# Patient Record
Sex: MALE | Race: WHITE | NOT HISPANIC OR LATINO | Employment: FULL TIME | URBAN - METROPOLITAN AREA
[De-identification: names, ages, dates, MRNs, and addresses within clinical notes are randomized per-mention and may not be internally consistent; named-entity substitution may affect disease eponyms.]

---

## 2017-01-31 ENCOUNTER — OFFICE VISIT - HEALTHEAST (OUTPATIENT)
Dept: INTERNAL MEDICINE | Facility: CLINIC | Age: 57
End: 2017-01-31

## 2017-01-31 DIAGNOSIS — I25.10 CORONARY ATHEROSCLEROSIS: ICD-10-CM

## 2017-01-31 DIAGNOSIS — E78.5 HYPERLIPIDEMIA: ICD-10-CM

## 2017-01-31 DIAGNOSIS — Z00.00 ROUTINE GENERAL MEDICAL EXAMINATION AT A HEALTH CARE FACILITY: ICD-10-CM

## 2017-01-31 DIAGNOSIS — H91.90 HEARING LOSS: ICD-10-CM

## 2017-01-31 LAB
CHOLEST SERPL-MCNC: 151 MG/DL
FASTING STATUS PATIENT QL REPORTED: YES
HDLC SERPL-MCNC: 54 MG/DL
LDLC SERPL CALC-MCNC: 82 MG/DL
PSA SERPL-MCNC: 1 NG/ML (ref 0–3.5)
TRIGL SERPL-MCNC: 76 MG/DL

## 2017-01-31 ASSESSMENT — MIFFLIN-ST. JEOR: SCORE: 1577.16

## 2017-02-20 ENCOUNTER — OFFICE VISIT - HEALTHEAST (OUTPATIENT)
Dept: CARDIOLOGY | Facility: CLINIC | Age: 57
End: 2017-02-20

## 2017-02-20 DIAGNOSIS — I25.10 ATHEROSCLEROSIS OF NATIVE CORONARY ARTERY OF NATIVE HEART WITHOUT ANGINA PECTORIS: ICD-10-CM

## 2017-02-20 DIAGNOSIS — E78.2 MIXED HYPERLIPIDEMIA: ICD-10-CM

## 2017-02-20 LAB
ATRIAL RATE - MUSE: 63 BPM
DIASTOLIC BLOOD PRESSURE - MUSE: NORMAL MMHG
INTERPRETATION ECG - MUSE: NORMAL
P AXIS - MUSE: 77 DEGREES
PR INTERVAL - MUSE: 156 MS
QRS DURATION - MUSE: 102 MS
QT - MUSE: 412 MS
QTC - MUSE: 421 MS
R AXIS - MUSE: 82 DEGREES
SYSTOLIC BLOOD PRESSURE - MUSE: NORMAL MMHG
T AXIS - MUSE: 47 DEGREES
VENTRICULAR RATE- MUSE: 63 BPM

## 2017-02-20 ASSESSMENT — MIFFLIN-ST. JEOR: SCORE: 1599.84

## 2017-03-16 ENCOUNTER — COMMUNICATION - HEALTHEAST (OUTPATIENT)
Dept: INTERNAL MEDICINE | Facility: CLINIC | Age: 57
End: 2017-03-16

## 2017-03-17 ENCOUNTER — OFFICE VISIT - HEALTHEAST (OUTPATIENT)
Dept: AUDIOLOGY | Facility: CLINIC | Age: 57
End: 2017-03-17

## 2017-03-17 DIAGNOSIS — H90.3 SENSORINEURAL HEARING LOSS, BILATERAL: ICD-10-CM

## 2017-03-17 DIAGNOSIS — H93.13 TINNITUS, BILATERAL: ICD-10-CM

## 2017-05-19 ENCOUNTER — COMMUNICATION - HEALTHEAST (OUTPATIENT)
Dept: INTERNAL MEDICINE | Facility: CLINIC | Age: 57
End: 2017-05-19

## 2017-05-22 ENCOUNTER — COMMUNICATION - HEALTHEAST (OUTPATIENT)
Dept: CARDIOLOGY | Facility: CLINIC | Age: 57
End: 2017-05-22

## 2017-05-22 DIAGNOSIS — E78.2 MIXED HYPERLIPIDEMIA: ICD-10-CM

## 2017-05-23 ENCOUNTER — COMMUNICATION - HEALTHEAST (OUTPATIENT)
Dept: CARDIOLOGY | Facility: CLINIC | Age: 57
End: 2017-05-23

## 2017-05-23 DIAGNOSIS — E78.2 MIXED HYPERLIPIDEMIA: ICD-10-CM

## 2017-09-27 ENCOUNTER — COMMUNICATION - HEALTHEAST (OUTPATIENT)
Dept: CARDIOLOGY | Facility: CLINIC | Age: 57
End: 2017-09-27

## 2017-09-27 DIAGNOSIS — E78.5 HYPERLIPIDEMIA: ICD-10-CM

## 2018-01-15 ENCOUNTER — COMMUNICATION - HEALTHEAST (OUTPATIENT)
Dept: ADMINISTRATIVE | Facility: CLINIC | Age: 58
End: 2018-01-15

## 2018-01-21 ENCOUNTER — RECORDS - HEALTHEAST (OUTPATIENT)
Dept: ADMINISTRATIVE | Facility: OTHER | Age: 58
End: 2018-01-21

## 2018-08-30 ENCOUNTER — OFFICE VISIT - HEALTHEAST (OUTPATIENT)
Dept: INTERNAL MEDICINE | Facility: CLINIC | Age: 58
End: 2018-08-30

## 2018-08-30 DIAGNOSIS — E78.5 HYPERLIPIDEMIA: ICD-10-CM

## 2018-08-30 DIAGNOSIS — I25.10 CORONARY ATHEROSCLEROSIS: ICD-10-CM

## 2018-08-30 DIAGNOSIS — Z00.00 ROUTINE GENERAL MEDICAL EXAMINATION AT A HEALTH CARE FACILITY: ICD-10-CM

## 2018-08-30 ASSESSMENT — MIFFLIN-ST. JEOR: SCORE: 1590.77

## 2018-09-13 ENCOUNTER — OFFICE VISIT - HEALTHEAST (OUTPATIENT)
Dept: CARDIOLOGY | Facility: CLINIC | Age: 58
End: 2018-09-13

## 2018-09-13 DIAGNOSIS — E78.2 MIXED HYPERLIPIDEMIA: ICD-10-CM

## 2018-09-13 DIAGNOSIS — I25.10 ATHEROSCLEROSIS OF NATIVE CORONARY ARTERY OF NATIVE HEART WITHOUT ANGINA PECTORIS: ICD-10-CM

## 2018-09-15 ENCOUNTER — RECORDS - HEALTHEAST (OUTPATIENT)
Dept: ADMINISTRATIVE | Facility: OTHER | Age: 58
End: 2018-09-15

## 2018-09-25 ENCOUNTER — COMMUNICATION - HEALTHEAST (OUTPATIENT)
Dept: INTERNAL MEDICINE | Facility: CLINIC | Age: 58
End: 2018-09-25

## 2018-09-25 DIAGNOSIS — B07.0 PLANTAR WARTS: ICD-10-CM

## 2018-10-24 ENCOUNTER — RECORDS - HEALTHEAST (OUTPATIENT)
Dept: ADMINISTRATIVE | Facility: OTHER | Age: 58
End: 2018-10-24

## 2018-12-02 ENCOUNTER — COMMUNICATION - HEALTHEAST (OUTPATIENT)
Dept: INTERNAL MEDICINE | Facility: CLINIC | Age: 58
End: 2018-12-02

## 2018-12-02 DIAGNOSIS — I25.10 CORONARY ATHEROSCLEROSIS: ICD-10-CM

## 2019-03-29 ENCOUNTER — RECORDS - HEALTHEAST (OUTPATIENT)
Dept: ADMINISTRATIVE | Facility: OTHER | Age: 59
End: 2019-03-29

## 2019-09-15 ENCOUNTER — COMMUNICATION - HEALTHEAST (OUTPATIENT)
Dept: CARDIOLOGY | Facility: CLINIC | Age: 59
End: 2019-09-15

## 2019-09-15 DIAGNOSIS — E78.2 MIXED HYPERLIPIDEMIA: ICD-10-CM

## 2019-09-15 DIAGNOSIS — I25.10 ATHEROSCLEROSIS OF NATIVE CORONARY ARTERY OF NATIVE HEART WITHOUT ANGINA PECTORIS: ICD-10-CM

## 2019-10-27 ENCOUNTER — COMMUNICATION - HEALTHEAST (OUTPATIENT)
Dept: INTERNAL MEDICINE | Facility: CLINIC | Age: 59
End: 2019-10-27

## 2019-10-27 DIAGNOSIS — I25.10 CORONARY ATHEROSCLEROSIS: ICD-10-CM

## 2019-12-06 ENCOUNTER — RECORDS - HEALTHEAST (OUTPATIENT)
Dept: ADMINISTRATIVE | Facility: OTHER | Age: 59
End: 2019-12-06

## 2019-12-11 ENCOUNTER — RECORDS - HEALTHEAST (OUTPATIENT)
Dept: ADMINISTRATIVE | Facility: OTHER | Age: 59
End: 2019-12-11

## 2019-12-11 ENCOUNTER — HOSPITAL ENCOUNTER (OUTPATIENT)
Dept: MRI IMAGING | Facility: CLINIC | Age: 59
Discharge: HOME | End: 2019-12-11
Attending: PHYSICIAN ASSISTANT

## 2019-12-11 DIAGNOSIS — M25.561 KNEE PAIN, RIGHT: ICD-10-CM

## 2019-12-16 ENCOUNTER — COMMUNICATION - HEALTHEAST (OUTPATIENT)
Dept: INTERNAL MEDICINE | Facility: CLINIC | Age: 59
End: 2019-12-16

## 2019-12-24 ENCOUNTER — RECORDS - HEALTHEAST (OUTPATIENT)
Dept: ADMINISTRATIVE | Facility: OTHER | Age: 59
End: 2019-12-24

## 2020-03-08 LAB
CHOLEST SERPL-MCNC: 128 MG/DL (ref 100–199)
HDLC SERPL-MCNC: 57 MG/DL
LDLC SERPL CALC-MCNC: 58 MG/DL
NON HDL CHOL. (LDL+VLDL): 71 MG/DL
TRIGLYCERIDES (HISTORICAL CONVERSION): 67 MG/DL

## 2020-03-13 ENCOUNTER — TRANSFERRED RECORDS (OUTPATIENT)
Dept: HEALTH INFORMATION MANAGEMENT | Facility: CLINIC | Age: 60
End: 2020-03-13

## 2020-04-01 ENCOUNTER — COMMUNICATION - HEALTHEAST (OUTPATIENT)
Dept: INTERNAL MEDICINE | Facility: CLINIC | Age: 60
End: 2020-04-01

## 2020-04-01 DIAGNOSIS — I25.10 CORONARY ATHEROSCLEROSIS: ICD-10-CM

## 2020-06-23 ENCOUNTER — RECORDS - HEALTHEAST (OUTPATIENT)
Dept: ADMINISTRATIVE | Facility: OTHER | Age: 60
End: 2020-06-23

## 2020-07-24 LAB
CREAT SERPL-MCNC: 1 MG/DL (ref 0.72–1.25)
GFR ESTIMATE EXT - HISTORICAL: >60 ML/MIN/1.73M2
GFR ESTIMATE, IF BLACK EXT - HISTORICAL: >60 ML/MIN/1.73M2

## 2020-07-29 ENCOUNTER — RECORDS - HEALTHEAST (OUTPATIENT)
Dept: ADMINISTRATIVE | Facility: OTHER | Age: 60
End: 2020-07-29

## 2020-08-05 ENCOUNTER — RECORDS - HEALTHEAST (OUTPATIENT)
Dept: HEALTH INFORMATION MANAGEMENT | Facility: CLINIC | Age: 60
End: 2020-08-05

## 2020-09-12 ENCOUNTER — RECORDS - HEALTHEAST (OUTPATIENT)
Dept: ADMINISTRATIVE | Facility: OTHER | Age: 60
End: 2020-09-12

## 2020-09-13 ENCOUNTER — RECORDS - HEALTHEAST (OUTPATIENT)
Dept: ADMINISTRATIVE | Facility: OTHER | Age: 60
End: 2020-09-13

## 2020-09-18 ENCOUNTER — RECORDS - HEALTHEAST (OUTPATIENT)
Dept: ADMINISTRATIVE | Facility: OTHER | Age: 60
End: 2020-09-18

## 2020-10-11 ENCOUNTER — COMMUNICATION - HEALTHEAST (OUTPATIENT)
Dept: CARDIOLOGY | Facility: CLINIC | Age: 60
End: 2020-10-11

## 2020-10-11 DIAGNOSIS — I25.10 ATHEROSCLEROSIS OF NATIVE CORONARY ARTERY OF NATIVE HEART WITHOUT ANGINA PECTORIS: ICD-10-CM

## 2020-10-11 DIAGNOSIS — E78.2 MIXED HYPERLIPIDEMIA: ICD-10-CM

## 2020-10-22 ENCOUNTER — OFFICE VISIT - HEALTHEAST (OUTPATIENT)
Dept: INTERNAL MEDICINE | Facility: CLINIC | Age: 60
End: 2020-10-22

## 2020-10-22 DIAGNOSIS — Z00.00 ROUTINE GENERAL MEDICAL EXAMINATION AT A HEALTH CARE FACILITY: ICD-10-CM

## 2020-10-22 DIAGNOSIS — E78.2 MIXED HYPERLIPIDEMIA: ICD-10-CM

## 2020-10-22 DIAGNOSIS — I25.10 ATHEROSCLEROSIS OF NATIVE CORONARY ARTERY OF NATIVE HEART WITHOUT ANGINA PECTORIS: ICD-10-CM

## 2020-10-22 DIAGNOSIS — D86.85 CARDIAC SARCOIDOSIS: ICD-10-CM

## 2020-10-22 LAB
ALBUMIN SERPL-MCNC: 4.1 G/DL (ref 3.5–5)
ALP SERPL-CCNC: 52 U/L (ref 45–120)
ALT SERPL W P-5'-P-CCNC: 59 U/L (ref 0–45)
ANION GAP SERPL CALCULATED.3IONS-SCNC: 9 MMOL/L (ref 5–18)
AST SERPL W P-5'-P-CCNC: 36 U/L (ref 0–40)
BILIRUB SERPL-MCNC: 1 MG/DL (ref 0–1)
BUN SERPL-MCNC: 17 MG/DL (ref 8–22)
CALCIUM SERPL-MCNC: 9.5 MG/DL (ref 8.5–10.5)
CHLORIDE BLD-SCNC: 101 MMOL/L (ref 98–107)
CHOLEST SERPL-MCNC: 168 MG/DL
CO2 SERPL-SCNC: 29 MMOL/L (ref 22–31)
CREAT SERPL-MCNC: 1.07 MG/DL (ref 0.7–1.3)
FASTING STATUS PATIENT QL REPORTED: YES
GFR SERPL CREATININE-BSD FRML MDRD: >60 ML/MIN/1.73M2
GLUCOSE BLD-MCNC: 89 MG/DL (ref 70–125)
HDLC SERPL-MCNC: 57 MG/DL
LDLC SERPL CALC-MCNC: 91 MG/DL
POTASSIUM BLD-SCNC: 4 MMOL/L (ref 3.5–5)
PROT SERPL-MCNC: 6.4 G/DL (ref 6–8)
PSA SERPL-MCNC: 1.2 NG/ML (ref 0–4.5)
SODIUM SERPL-SCNC: 139 MMOL/L (ref 136–145)
TRIGL SERPL-MCNC: 99 MG/DL

## 2020-10-22 ASSESSMENT — MIFFLIN-ST. JEOR: SCORE: 1590.77

## 2020-10-23 LAB — HCV AB SERPL QL IA: NEGATIVE

## 2021-02-04 ENCOUNTER — RECORDS - HEALTHEAST (OUTPATIENT)
Dept: ADMINISTRATIVE | Facility: OTHER | Age: 61
End: 2021-02-04

## 2021-03-17 ENCOUNTER — TRANSFERRED RECORDS (OUTPATIENT)
Dept: HEALTH INFORMATION MANAGEMENT | Facility: CLINIC | Age: 61
End: 2021-03-17

## 2021-03-17 ENCOUNTER — RECORDS - HEALTHEAST (OUTPATIENT)
Dept: ADMINISTRATIVE | Facility: OTHER | Age: 61
End: 2021-03-17

## 2021-03-17 ENCOUNTER — TELEPHONE (OUTPATIENT)
Dept: OPHTHALMOLOGY | Facility: CLINIC | Age: 61
End: 2021-03-17

## 2021-03-17 ENCOUNTER — MEDICAL CORRESPONDENCE (OUTPATIENT)
Dept: HEALTH INFORMATION MANAGEMENT | Facility: CLINIC | Age: 61
End: 2021-03-17

## 2021-03-17 NOTE — TELEPHONE ENCOUNTER
Spoke to patient as we received referral from Dr. Oden to see Dr. Boyd within one week.  He feels his vision is slightly worse.  Assisted with scheduling with Dr. Oliver Dimas on 3/17/2021 at 4:42 PM

## 2021-03-18 NOTE — TELEPHONE ENCOUNTER
FUTURE VISIT INFORMATION      FUTURE VISIT INFORMATION:    Date: 3.24.21    Time: 7:15 AM    Location: [facility]  REFERRAL INFORMATION:    Referring provider:  Dr Rashel Oden    Referring providers clinic:  [facility]    Reason for visit/diagnosis: concern for optic neuritis    RECORDS REQUESTED FROM:       Clinic name Comments Records Status Imaging Status   [facility] 3.17.21 Dr Oden  6.23.21 Scanned                                    Action 3.18.21 7:43 AM BRISSA   Action Taken Sent request to [facility] [phone]

## 2021-03-24 ENCOUNTER — TELEPHONE (OUTPATIENT)
Dept: OPHTHALMOLOGY | Facility: CLINIC | Age: 61
End: 2021-03-24

## 2021-03-24 ENCOUNTER — PRE VISIT (OUTPATIENT)
Dept: OPHTHALMOLOGY | Facility: CLINIC | Age: 61
End: 2021-03-24

## 2021-03-24 ENCOUNTER — OFFICE VISIT (OUTPATIENT)
Dept: OPHTHALMOLOGY | Facility: CLINIC | Age: 61
End: 2021-03-24
Payer: COMMERCIAL

## 2021-03-24 DIAGNOSIS — D86.9 SARCOIDOSIS: ICD-10-CM

## 2021-03-24 DIAGNOSIS — H53.40 VISUAL FIELD DEFECT: ICD-10-CM

## 2021-03-24 DIAGNOSIS — H46.9 OPTIC NEUROPATHY: Primary | ICD-10-CM

## 2021-03-24 DIAGNOSIS — H53.40 VISUAL FIELD DEFECT: Primary | ICD-10-CM

## 2021-03-24 PROBLEM — D86.85 CARDIAC SARCOIDOSIS: Status: ACTIVE | Noted: 2020-07-31

## 2021-03-24 PROBLEM — E78.5 HYPERLIPIDEMIA: Status: ACTIVE | Noted: 2021-03-24

## 2021-03-24 PROBLEM — N40.0 BENIGN PROSTATIC HYPERPLASIA: Status: ACTIVE | Noted: 2021-03-24

## 2021-03-24 PROBLEM — I47.10 SVT (SUPRAVENTRICULAR TACHYCARDIA) (H): Status: ACTIVE | Noted: 2020-03-08

## 2021-03-24 PROCEDURE — 92133 CPTRZD OPH DX IMG PST SGM ON: CPT | Performed by: OPHTHALMOLOGY

## 2021-03-24 PROCEDURE — 92083 EXTENDED VISUAL FIELD XM: CPT | Performed by: OPHTHALMOLOGY

## 2021-03-24 PROCEDURE — 99245 OFF/OP CONSLTJ NEW/EST HI 55: CPT | Mod: GC | Performed by: OPHTHALMOLOGY

## 2021-03-24 RX ORDER — AMIODARONE HYDROCHLORIDE 200 MG/1
100 TABLET ORAL
COMMUNITY
Start: 2020-09-18 | End: 2022-01-06

## 2021-03-24 RX ORDER — ACETAMINOPHEN 325 MG/1
325 TABLET ORAL
COMMUNITY

## 2021-03-24 RX ORDER — METOPROLOL SUCCINATE 25 MG/1
25 TABLET, EXTENDED RELEASE ORAL
COMMUNITY
Start: 2020-10-12 | End: 2022-01-06

## 2021-03-24 RX ORDER — PREDNISONE 20 MG/1
20 TABLET ORAL 2 TIMES DAILY
COMMUNITY
Start: 2021-03-17 | End: 2022-01-06

## 2021-03-24 RX ORDER — PANTOPRAZOLE SODIUM 40 MG/1
TABLET, DELAYED RELEASE ORAL
COMMUNITY
Start: 2021-03-18 | End: 2022-01-06

## 2021-03-24 RX ORDER — LISINOPRIL 2.5 MG/1
2.5 TABLET ORAL DAILY
COMMUNITY
Start: 2021-03-09

## 2021-03-24 RX ORDER — FLUTICASONE PROPIONATE 50 MCG
2 SPRAY, SUSPENSION (ML) NASAL
COMMUNITY

## 2021-03-24 RX ORDER — ATORVASTATIN CALCIUM 20 MG/1
20 TABLET, FILM COATED ORAL
COMMUNITY
Start: 2020-10-12 | End: 2022-01-06

## 2021-03-24 RX ORDER — NITROGLYCERIN 0.4 MG/1
0.4 TABLET SUBLINGUAL
COMMUNITY
Start: 2020-04-02 | End: 2023-02-03

## 2021-03-24 ASSESSMENT — VISUAL ACUITY
OS_SC: 20/20
METHOD: SNELLEN - LINEAR
OD_SC: 20/50

## 2021-03-24 ASSESSMENT — SLIT LAMP EXAM - LIDS
COMMENTS: NORMAL
COMMENTS: NORMAL

## 2021-03-24 ASSESSMENT — CONF VISUAL FIELD
METHOD: COUNTING FINGERS
OD_NORMAL: 1
OS_NORMAL: 1

## 2021-03-24 ASSESSMENT — TONOMETRY
OD_IOP_MMHG: 17
IOP_METHOD: ICARE
OS_IOP_MMHG: 10

## 2021-03-24 NOTE — PROGRESS NOTES
Assessment & Plan     Fidel Woo is a 60 year old male with the following diagnoses:   1. Optic neuropathy    2. Visual field defect    3. Sarcoidosis         Patient was sent for consultation by Dr. Rashel Oden for optic disc edema both eyes.      HPI: 60 year old man with active sarcoidosis (of the heart, biopsy proven) was referred here for bilateral optic disc edema. In March 2021, he started noticing blots of blurred vision/blind spots while playing tennis. More recently he has been seeing flashes of light of different colors with patterns including J or W that move around. It happens every time he closes his eyes that started in March as well. on March 17th, he closed his left eye and he noticed that the his centeral vision is blurry while looking at the monitor. He thinks that his vision has been progressively getting worse. He has been having headaches at the top of his headache that started this weekend. Headaches were not associated with photophobia or phonophobia. He also had eye irritation in the right eye that has been intermittent since march 11. He denies any pain with eye movements or any diplopia. When he is reading on monitor, the blurry part look pixilated with LED lights. Never used sildenafil and he does not have sleep apnea. He feels that his memory has been impaired and he is shaking more frequent, he also has impaired motor function (noticed while playing guitar). He started noticing that for the past 1 year after he was diagnosed with sarcoidosis . He also has fatigue. His doctor thinks that his sarcoid is inactive.     He was tapered of prednisone (last dose 1 month ago, Feb 26, 2021). He started noticing subtle visual around that time. After he was seen at [facility] specialist, he was started on 40mg (for 1 week). He has not noticed any changes after prednisone, instead he was getting worse    Since winter he has been seeing halos from head lights while  driving at night.     He is taking amiodarone 100mg.  He started this in September 2020.      Cardiac Sarcoidosis diagnosed in 3/2020 with biopsy , being managed by Dr Adriano Paiz  [phone]  (cardiologist)    No MRI done     Independent historians:  Patient and wife     Review of outside testing:  None    My interpretation performed today of outside testing:  Not applicable      Review of outside clinical notes:  Records from Dr. Oden    Past medical history: sarcoid arrhythmia, heart attack in 2010    Meds: prednisone 40mg, lisinopril, amiodarone since sept 2020 (decreased to 100mg 3 weeks ago), lipitor, metaprolol, mycophenolate 2 tablets 500mg twice a day since June 2020      Family history / social history:   Noncontributory    Exam:  Her visual acuity is 20/50 in the right eye and 20/20 in the left eye. IOP is 17/10. Her was unable to read color plates in the right eye, 11/11 in the left eye    Tests ordered and interpreted today:  Automated visual fields showed right central defect extending inferiorly in the right eye and superior arcuate defect in the left eye  OCT optic nerves: RNFL thickening in both eyes, worse in the right eye     Discussion of management / interpretation with another provider: None      My impression is that he has bilateral optic disc swelling worse in the right eye.  He has evidence of a bilateral optic neuropathy.  This is worse in the right eye than the left eye.  This is been slowly progressive for the last few weeks.  He denies pain.  He denies symptoms of giant cell arteritis.  He is currently on amiodarone and there is concern that he has a bilateral optic neuropathy as result of the amiodarone.  He also has a history of sarcoidosis.  It is his understanding that his sarcoid is currently inactive.  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  I believe the only way to really tell this is whether he has inflammation seen on MRI.  I will obtain  an MRI of the orbit.  He has a defibrillator which he states is MRI compatible.  I called his cardiologist and confirmed that we could stop the amiodarone.     The patient is presenting with an acute illness that potentially poses a threat to life or bodily function (vision).                Attending Physician Attestation:  Complete documentation of historical and exam elements from today's encounter can be found in the full encounter summary report (not reduplicated in this progress note).  I personally obtained the chief complaint(s) and history of present illness.  I confirmed and edited as necessary the review of systems, past medical/surgical history, family history, social history, and examination findings as documented by others; and I examined the patient myself.  I personally reviewed the relevant tests, images, and reports as documented above.  I formulated and edited as necessary the assessment and plan and discussed the findings and management plan with the patient and family. I personally reviewed the ophthalmic test(s) associated with this encounter, agree with the interpretation(s) as documented by the resident/fellow, and have edited the corresponding report(s) as necessary.  - Michael Cote MD  Neuro-ophthalmology fellow  [facility]

## 2021-03-24 NOTE — TELEPHONE ENCOUNTER
Spoke to patient.  MRI scheduled for 3/25 at the [facility].  Dr. Boyd also spoke to patient's cardiologist Dr. Jose and ok for patient to stop amiodarone.  Patient is still taking prednisone 40 mg daily.  Will let Dr. Boyd know.     Stephanie Dimas on 3/24/2021 at 1:41 PM

## 2021-03-24 NOTE — Clinical Note
3/24/2021       RE: Fidel Woo  [address] [address]     Dear Colleague,    Thank you for referring your patient, Fidel Woo, to the [facility] [place] at [facility]. Please see a copy of my visit note below.         Assessment & Plan     Fidel Woo is a 60 year old male with the following diagnoses:   1. Optic neuropathy    2. Visual field defect    3. Sarcoidosis         Patient was sent for consultation by Dr. Rashel Oden for optic disc edema both eyes.      HPI: 60 year old man with active sarcoidosis (of the heart, biopsy proven) was referred here for bilateral optic disc edema. In March 2021, he started noticing blots of blurred vision/blind spots while playing tennis. More recently he has been seeing flashes of light of different colors with patterns including J or W that move around. It happens every time he closes his eyes that started in March as well. on March 17th, he closed his left eye and he noticed that the his centeral vision is blurry while looking at the monitor. He thinks that his vision has been progressively getting worse. He has been having headaches at the top of his headache that started this weekend. Headaches were not associated with photophobia or phonophobia. He also had eye irritation in the right eye that has been intermittent since march 11. He denies any pain with eye movements or any diplopia. When he is reading on monitor, the blurry part look pixilated with LED lights. Never used sildenafil and he does not have sleep apnea. He feels that his memory has been impaired and he is shaking more frequent, he also has impaired motor function (noticed while playing guitar). He started noticing that for the past 1 year after he was diagnosed with sarcoidosis . He also has fatigue. His doctor thinks that his sarcoid is inactive.     He was tapered of prednisone  (last dose 1 month ago, Feb 26, 2021). He started noticing subtle visual around that time. After he was seen at [facility] eye specialist, he was started on 40mg (for 1 week). He has not noticed any changes after prednisone, instead he was getting worse    Since winter he has been seeing halos from head lights while driving at night.     He is taking amiodarone 100mg.  He started this in September 2020.      Cardiac Sarcoidosis diagnosed in 3/2020 with biopsy , being managed by Dr Adriano Paiz  [phone]  (cardiologist)    No MRI done     Independent historians:  Patient and wife     Review of outside testing:  None    My interpretation performed today of outside testing:  Not applicable      Review of outside clinical notes:  Records from Dr. Oden    Past medical history: sarcoid arrhythmia, heart attack in 2010    Meds: prednisone 40mg, lisinopril, amiodarone since sept 2020 (decreased to 100mg 3 weeks ago), lipitor, metaprolol, mycophenolate 2 tablets 500mg twice a day since June 2020      Family history / social history:   Noncontributory    Exam:  Her visual acuity is 20/50 in the right eye and 20/20 in the left eye. IOP is 17/10. Her was unable to read color plates in the right eye, 11/11 in the left eye    Tests ordered and interpreted today:  Automated visual fields showed right central defect extending inferiorly in the right eye and superior arcuate defect in the left eye  OCT optic nerves: RNFL thickening in both eyes, worse in the right eye     Discussion of management / interpretation with another provider: None      My impression is that he has bilateral optic disc swelling worse in the right eye.  He has evidence of a bilateral optic neuropathy.  This is worse in the right eye than the left eye.  This is been slowly progressive for the last few weeks.  He denies pain.  He denies symptoms of giant cell arteritis.  He is currently on amiodarone and there is concern that he has a bilateral  optic neuropathy as result of the amiodarone.  He also has a history of sarcoidosis.  It is his understanding that his sarcoid is currently inactive.  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  I believe the only way to really tell this is whether he has inflammation seen on MRI.  I will obtain an MRI of the orbit.  He has a defibrillator which he states is MRI compatible.  I called his cardiologist and confirmed that we could stop the amiodarone.             Attending Physician Attestation:  Complete documentation of historical and exam elements from today's encounter can be found in the full encounter summary report (not reduplicated in this progress note).  I personally obtained the chief complaint(s) and history of present illness.  I confirmed and edited as necessary the review of systems, past medical/surgical history, family history, social history, and examination findings as documented by others; and I examined the patient myself.  I personally reviewed the relevant tests, images, and reports as documented above.  I formulated and edited as necessary the assessment and plan and discussed the findings and management plan with the patient and family. I personally reviewed the ophthalmic test(s) associated with this encounter, agree with the interpretation(s) as documented by the resident/fellow, and have edited the corresponding report(s) as necessary.  - Michael Cote MD  Neuro-ophthalmology fellow  [facility]            Again, thank you for allowing me to participate in the care of your patient.      Sincerely,    Michael Boyd MD

## 2021-03-24 NOTE — LETTER
3/24/2021         RE:  :  MRN: Fidel Woo  1960  [id number]     Dear Dr. Oden,    Thank you for asking me to see your very pleasant patient, Fidel Woo, in neuro-ophthalmic consultation.  I would like to thank you for sending your records and I have summarized them in the history of present illness. He presented with his spouse who provided additional history.  My assessment and plan are below.  For further details, please see my attached clinic note.      Assessment & Plan     Fidel Woo is a 60 year old male with the following diagnoses:   1. Optic neuropathy    2. Visual field defect    3. Sarcoidosis         Patient was sent for consultation by Dr. Rashel Oden for optic disc edema both eyes.      HPI: 60 year old man with active sarcoidosis (of the heart, biopsy proven) was referred here for bilateral optic disc edema. In 2021, he started noticing blots of blurred vision/blind spots while playing tennis. More recently he has been seeing flashes of light of different colors with patterns including J or W that move around. It happens every time he closes his eyes that started in March as well. on , he closed his left eye and he noticed that the his centeral vision is blurry while looking at the monitor. He thinks that his vision has been progressively getting worse. He has been having headaches at the top of his headache that started this weekend. Headaches were not associated with photophobia or phonophobia. He also had eye irritation in the right eye that has been intermittent since . He denies any pain with eye movements or any diplopia. When he is reading on monitor, the blurry part look pixilated with LED lights. Never used sildenafil and he does not have sleep apnea. He feels that his memory has been impaired and he is shaking more frequent, he also has impaired motor function (noticed while playing guitar). He started noticing that for the  past 1 year after he was diagnosed with sarcoidosis . He also has fatigue. His doctor thinks that his sarcoid is inactive.     He was tapered of prednisone (last dose 1 month ago, Feb 26, 2021). He started noticing subtle visual around that time. After he was seen at [facility] specialist, he was started on 40mg (for 1 week). He has not noticed any changes after prednisone, instead he was getting worse    Since winter he has been seeing halos from head lights while driving at night.     He is taking amiodarone 100mg.  He started this in September 2020.      Cardiac Sarcoidosis diagnosed in 3/2020 with biopsy , being managed by Dr Adriano Paiz  [phone]  (cardiologist)    No MRI done     Independent historians:  Patient and wife     Review of outside testing:  None    My interpretation performed today of outside testing:  Not applicable      Review of outside clinical notes:  Records from Dr. Oden    Past medical history: sarcoid arrhythmia, heart attack in 2010    Meds: prednisone 40mg, lisinopril, amiodarone since sept 2020 (decreased to 100mg 3 weeks ago), lipitor, metaprolol, mycophenolate 2 tablets 500mg twice a day since June 2020      Family history / social history:   Noncontributory    Exam:  Her visual acuity is 20/50 in the right eye and 20/20 in the left eye. IOP is 17/10. Her was unable to read color plates in the right eye, 11/11 in the left eye    Tests ordered and interpreted today:  Automated visual fields showed right central defect extending inferiorly in the right eye and superior arcuate defect in the left eye  OCT optic nerves: RNFL thickening in both eyes, worse in the right eye     Discussion of management / interpretation with another provider: None      My impression is that he has bilateral optic disc swelling worse in the right eye.  He has evidence of a bilateral optic neuropathy.  This is worse in the right eye than the left eye.  This is been slowly progressive for  the last few weeks.  He denies pain.  He denies symptoms of giant cell arteritis.  He is currently on amiodarone and there is concern that he has a bilateral optic neuropathy as result of the amiodarone.  He also has a history of sarcoidosis.  It is his understanding that his sarcoid is currently inactive.  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  I believe the only way to really tell this is whether he has inflammation seen on MRI.  I will obtain an MRI of the orbit.  He has a defibrillator which he states is MRI compatible.  I called his cardiologist and confirmed that we could stop the amiodarone.     The patient is presenting with an acute illness that potentially poses a threat to life or bodily function (vision).    Again, thank you for allowing me to participate in the care of your patient.      Sincerely,    Michael Boyd MD  Professor  Ophthalmology Residency   Director of Neuro-Ophthalmology  [employer] Endowed Chair  Departments of Ophthalmology, Neurology, and Neurosurgery  [address]  [address]  T - [phone]   - [phone]  [email or web address]      CC: Rashel Oden DO  [facility]  [address]  [place] [address]  Via Fax: [phone]    DX = optic neuropathy, sarcoid, amiodarone

## 2021-03-24 NOTE — NURSING NOTE
"Chief Complaints and History of Present Illnesses   Patient presents with     Optic Neuritis Evaluation     Chief Complaint(s) and History of Present Illness(es)     Optic Neuritis Evaluation     Laterality: right eye    Onset: 2 weeks ago    Quality: missing vision and spots in vision    Associated symptoms: headache (not as much when first noticed issues and feels its getting worse) and eye pain (RE, dry or like \"when you know your getting pink eye\")    Pain scale: 1/10              Comments     Fidel Woo is being seen for a consult today by the request of Dr. Oden for Optic Neuritis.  Pt notes in the bottom part of vision in the RE is gray and on the edge of it he notes it is 'splotchy', first noticed this issue around 2 weeks ago, March 11th.     Angeline NIELSEN March 24, 2021 7:15 AM                    "

## 2021-03-25 ENCOUNTER — HOSPITAL ENCOUNTER (OUTPATIENT)
Dept: MRI IMAGING | Facility: CLINIC | Age: 61
End: 2021-03-25
Attending: OPHTHALMOLOGY
Payer: COMMERCIAL

## 2021-03-25 ENCOUNTER — ANCILLARY PROCEDURE (OUTPATIENT)
Dept: CARDIOLOGY | Facility: CLINIC | Age: 61
End: 2021-03-25
Attending: OPHTHALMOLOGY
Payer: COMMERCIAL

## 2021-03-25 ENCOUNTER — HOSPITAL ENCOUNTER (OUTPATIENT)
Dept: GENERAL RADIOLOGY | Facility: CLINIC | Age: 61
End: 2021-03-25
Attending: OPHTHALMOLOGY
Payer: COMMERCIAL

## 2021-03-25 DIAGNOSIS — H53.40 VISUAL FIELD DEFECT: ICD-10-CM

## 2021-03-25 DIAGNOSIS — I42.9 CARDIOMYOPATHY (H): ICD-10-CM

## 2021-03-25 DIAGNOSIS — H46.9 OPTIC NEUROPATHY: ICD-10-CM

## 2021-03-25 DIAGNOSIS — D86.9 SARCOIDOSIS: ICD-10-CM

## 2021-03-25 DIAGNOSIS — I42.9 CARDIOMYOPATHY (H): Primary | ICD-10-CM

## 2021-03-25 PROCEDURE — A9585 GADOBUTROL INJECTION: HCPCS | Performed by: OPHTHALMOLOGY

## 2021-03-25 PROCEDURE — 70543 MRI ORBT/FAC/NCK W/O &W/DYE: CPT

## 2021-03-25 PROCEDURE — 71046 X-RAY EXAM CHEST 2 VIEWS: CPT | Mod: 26 | Performed by: RADIOLOGY

## 2021-03-25 PROCEDURE — 93287 PERI-PX DEVICE EVAL & PRGR: CPT

## 2021-03-25 PROCEDURE — 71046 X-RAY EXAM CHEST 2 VIEWS: CPT

## 2021-03-25 PROCEDURE — 93287 PERI-PX DEVICE EVAL & PRGR: CPT | Mod: 26 | Performed by: INTERNAL MEDICINE

## 2021-03-25 PROCEDURE — 70543 MRI ORBT/FAC/NCK W/O &W/DYE: CPT | Mod: 26 | Performed by: RADIOLOGY

## 2021-03-25 PROCEDURE — 255N000002 HC RX 255 OP 636: Performed by: OPHTHALMOLOGY

## 2021-03-25 RX ORDER — GADOBUTROL 604.72 MG/ML
7.5 INJECTION INTRAVENOUS ONCE
Status: COMPLETED | OUTPATIENT
Start: 2021-03-25 | End: 2021-03-25

## 2021-03-25 RX ADMIN — GADOBUTROL 7.5 ML: 604.72 INJECTION INTRAVENOUS at 16:30

## 2021-03-26 ENCOUNTER — TELEPHONE (OUTPATIENT)
Dept: OPHTHALMOLOGY | Facility: CLINIC | Age: 61
End: 2021-03-26

## 2021-03-26 LAB
MDC_IDC_LEAD_IMPLANT_DT: NORMAL
MDC_IDC_LEAD_LOCATION: NORMAL
MDC_IDC_LEAD_LOCATION_DETAIL_1: NORMAL
MDC_IDC_LEAD_MFG: NORMAL
MDC_IDC_LEAD_MODEL: NORMAL
MDC_IDC_LEAD_POLARITY_TYPE: NORMAL
MDC_IDC_LEAD_SERIAL: NORMAL
MDC_IDC_LEAD_SPECIAL_FUNCTION: NORMAL
MDC_IDC_MSMT_BATTERY_DTM: NORMAL
MDC_IDC_MSMT_BATTERY_DTM: NORMAL
MDC_IDC_MSMT_BATTERY_REMAINING_LONGEVITY: 105 MO
MDC_IDC_MSMT_BATTERY_REMAINING_LONGEVITY: 105 MO
MDC_IDC_MSMT_BATTERY_RRT_TRIGGER: 2.73
MDC_IDC_MSMT_BATTERY_RRT_TRIGGER: 2.73
MDC_IDC_MSMT_BATTERY_STATUS: NORMAL
MDC_IDC_MSMT_BATTERY_STATUS: NORMAL
MDC_IDC_MSMT_BATTERY_VOLTAGE: 2.98 V
MDC_IDC_MSMT_BATTERY_VOLTAGE: 2.98 V
MDC_IDC_MSMT_CAP_CHARGE_DTM: NORMAL
MDC_IDC_MSMT_CAP_CHARGE_DTM: NORMAL
MDC_IDC_MSMT_CAP_CHARGE_ENERGY: 18 J
MDC_IDC_MSMT_CAP_CHARGE_ENERGY: 18 J
MDC_IDC_MSMT_CAP_CHARGE_TIME: 3.87
MDC_IDC_MSMT_CAP_CHARGE_TIME: 3.87
MDC_IDC_MSMT_CAP_CHARGE_TYPE: NORMAL
MDC_IDC_MSMT_CAP_CHARGE_TYPE: NORMAL
MDC_IDC_MSMT_LEADCHNL_RA_IMPEDANCE_VALUE: 494 OHM
MDC_IDC_MSMT_LEADCHNL_RA_IMPEDANCE_VALUE: 494 OHM
MDC_IDC_MSMT_LEADCHNL_RA_PACING_THRESHOLD_AMPLITUDE: 0.62 V
MDC_IDC_MSMT_LEADCHNL_RA_PACING_THRESHOLD_AMPLITUDE: 0.62 V
MDC_IDC_MSMT_LEADCHNL_RA_PACING_THRESHOLD_AMPLITUDE: 0.75 V
MDC_IDC_MSMT_LEADCHNL_RA_PACING_THRESHOLD_AMPLITUDE: 0.75 V
MDC_IDC_MSMT_LEADCHNL_RA_PACING_THRESHOLD_PULSEWIDTH: 0.4 MS
MDC_IDC_MSMT_LEADCHNL_RA_SENSING_INTR_AMPL: 2.12 MV
MDC_IDC_MSMT_LEADCHNL_RA_SENSING_INTR_AMPL: 2.12 MV
MDC_IDC_MSMT_LEADCHNL_RA_SENSING_INTR_AMPL: 5 MV
MDC_IDC_MSMT_LEADCHNL_RA_SENSING_INTR_AMPL: 5 MV
MDC_IDC_MSMT_LEADCHNL_RV_IMPEDANCE_VALUE: 323 OHM
MDC_IDC_MSMT_LEADCHNL_RV_IMPEDANCE_VALUE: 323 OHM
MDC_IDC_MSMT_LEADCHNL_RV_IMPEDANCE_VALUE: 437 OHM
MDC_IDC_MSMT_LEADCHNL_RV_IMPEDANCE_VALUE: 437 OHM
MDC_IDC_MSMT_LEADCHNL_RV_PACING_THRESHOLD_AMPLITUDE: 1 V
MDC_IDC_MSMT_LEADCHNL_RV_PACING_THRESHOLD_PULSEWIDTH: 0.4 MS
MDC_IDC_MSMT_LEADCHNL_RV_SENSING_INTR_AMPL: 11 MV
MDC_IDC_MSMT_LEADCHNL_RV_SENSING_INTR_AMPL: 11 MV
MDC_IDC_MSMT_LEADCHNL_RV_SENSING_INTR_AMPL: 9.62 MV
MDC_IDC_MSMT_LEADCHNL_RV_SENSING_INTR_AMPL: 9.62 MV
MDC_IDC_PG_IMPLANT_DTM: NORMAL
MDC_IDC_PG_IMPLANT_DTM: NORMAL
MDC_IDC_PG_MFG: NORMAL
MDC_IDC_PG_MFG: NORMAL
MDC_IDC_PG_MODEL: NORMAL
MDC_IDC_PG_MODEL: NORMAL
MDC_IDC_PG_SERIAL: NORMAL
MDC_IDC_PG_SERIAL: NORMAL
MDC_IDC_PG_TYPE: NORMAL
MDC_IDC_PG_TYPE: NORMAL
MDC_IDC_SESS_CLINIC_NAME: NORMAL
MDC_IDC_SESS_CLINIC_NAME: NORMAL
MDC_IDC_SESS_DTM: NORMAL
MDC_IDC_SESS_DTM: NORMAL
MDC_IDC_SESS_TYPE: NORMAL
MDC_IDC_SESS_TYPE: NORMAL
MDC_IDC_SET_BRADY_AT_MODE_SWITCH_RATE: 171 {BEATS}/MIN
MDC_IDC_SET_BRADY_AT_MODE_SWITCH_RATE: 171 {BEATS}/MIN
MDC_IDC_SET_BRADY_HYSTRATE: NORMAL
MDC_IDC_SET_BRADY_HYSTRATE: NORMAL
MDC_IDC_SET_BRADY_LOWRATE: 60 {BEATS}/MIN
MDC_IDC_SET_BRADY_LOWRATE: 60 {BEATS}/MIN
MDC_IDC_SET_BRADY_MAX_SENSOR_RATE: 130 {BEATS}/MIN
MDC_IDC_SET_BRADY_MAX_SENSOR_RATE: 130 {BEATS}/MIN
MDC_IDC_SET_BRADY_MAX_TRACKING_RATE: 130 {BEATS}/MIN
MDC_IDC_SET_BRADY_MAX_TRACKING_RATE: 130 {BEATS}/MIN
MDC_IDC_SET_BRADY_MODE: NORMAL
MDC_IDC_SET_BRADY_MODE: NORMAL
MDC_IDC_SET_BRADY_PAV_DELAY_LOW: 180 MS
MDC_IDC_SET_BRADY_PAV_DELAY_LOW: 180 MS
MDC_IDC_SET_BRADY_SAV_DELAY_LOW: 150 MS
MDC_IDC_SET_BRADY_SAV_DELAY_LOW: 150 MS
MDC_IDC_SET_LEADCHNL_RA_PACING_AMPLITUDE: 1.5 V
MDC_IDC_SET_LEADCHNL_RA_PACING_AMPLITUDE: 1.5 V
MDC_IDC_SET_LEADCHNL_RA_PACING_ANODE_ELECTRODE_1: NORMAL
MDC_IDC_SET_LEADCHNL_RA_PACING_ANODE_ELECTRODE_1: NORMAL
MDC_IDC_SET_LEADCHNL_RA_PACING_ANODE_LOCATION_1: NORMAL
MDC_IDC_SET_LEADCHNL_RA_PACING_ANODE_LOCATION_1: NORMAL
MDC_IDC_SET_LEADCHNL_RA_PACING_CAPTURE_MODE: NORMAL
MDC_IDC_SET_LEADCHNL_RA_PACING_CAPTURE_MODE: NORMAL
MDC_IDC_SET_LEADCHNL_RA_PACING_CATHODE_ELECTRODE_1: NORMAL
MDC_IDC_SET_LEADCHNL_RA_PACING_CATHODE_ELECTRODE_1: NORMAL
MDC_IDC_SET_LEADCHNL_RA_PACING_CATHODE_LOCATION_1: NORMAL
MDC_IDC_SET_LEADCHNL_RA_PACING_CATHODE_LOCATION_1: NORMAL
MDC_IDC_SET_LEADCHNL_RA_PACING_POLARITY: NORMAL
MDC_IDC_SET_LEADCHNL_RA_PACING_POLARITY: NORMAL
MDC_IDC_SET_LEADCHNL_RA_PACING_PULSEWIDTH: 0.4 MS
MDC_IDC_SET_LEADCHNL_RA_PACING_PULSEWIDTH: 0.4 MS
MDC_IDC_SET_LEADCHNL_RA_SENSING_ANODE_ELECTRODE_1: NORMAL
MDC_IDC_SET_LEADCHNL_RA_SENSING_ANODE_ELECTRODE_1: NORMAL
MDC_IDC_SET_LEADCHNL_RA_SENSING_ANODE_LOCATION_1: NORMAL
MDC_IDC_SET_LEADCHNL_RA_SENSING_ANODE_LOCATION_1: NORMAL
MDC_IDC_SET_LEADCHNL_RA_SENSING_CATHODE_ELECTRODE_1: NORMAL
MDC_IDC_SET_LEADCHNL_RA_SENSING_CATHODE_ELECTRODE_1: NORMAL
MDC_IDC_SET_LEADCHNL_RA_SENSING_CATHODE_LOCATION_1: NORMAL
MDC_IDC_SET_LEADCHNL_RA_SENSING_CATHODE_LOCATION_1: NORMAL
MDC_IDC_SET_LEADCHNL_RA_SENSING_POLARITY: NORMAL
MDC_IDC_SET_LEADCHNL_RA_SENSING_POLARITY: NORMAL
MDC_IDC_SET_LEADCHNL_RA_SENSING_SENSITIVITY: 0.3 MV
MDC_IDC_SET_LEADCHNL_RA_SENSING_SENSITIVITY: 0.3 MV
MDC_IDC_SET_LEADCHNL_RV_PACING_AMPLITUDE: 2.5 V
MDC_IDC_SET_LEADCHNL_RV_PACING_AMPLITUDE: 2.5 V
MDC_IDC_SET_LEADCHNL_RV_PACING_ANODE_ELECTRODE_1: NORMAL
MDC_IDC_SET_LEADCHNL_RV_PACING_ANODE_ELECTRODE_1: NORMAL
MDC_IDC_SET_LEADCHNL_RV_PACING_ANODE_LOCATION_1: NORMAL
MDC_IDC_SET_LEADCHNL_RV_PACING_ANODE_LOCATION_1: NORMAL
MDC_IDC_SET_LEADCHNL_RV_PACING_CAPTURE_MODE: NORMAL
MDC_IDC_SET_LEADCHNL_RV_PACING_CAPTURE_MODE: NORMAL
MDC_IDC_SET_LEADCHNL_RV_PACING_CATHODE_ELECTRODE_1: NORMAL
MDC_IDC_SET_LEADCHNL_RV_PACING_CATHODE_ELECTRODE_1: NORMAL
MDC_IDC_SET_LEADCHNL_RV_PACING_CATHODE_LOCATION_1: NORMAL
MDC_IDC_SET_LEADCHNL_RV_PACING_CATHODE_LOCATION_1: NORMAL
MDC_IDC_SET_LEADCHNL_RV_PACING_POLARITY: NORMAL
MDC_IDC_SET_LEADCHNL_RV_PACING_POLARITY: NORMAL
MDC_IDC_SET_LEADCHNL_RV_PACING_PULSEWIDTH: 0.4 MS
MDC_IDC_SET_LEADCHNL_RV_PACING_PULSEWIDTH: 0.4 MS
MDC_IDC_SET_LEADCHNL_RV_SENSING_ANODE_ELECTRODE_1: NORMAL
MDC_IDC_SET_LEADCHNL_RV_SENSING_ANODE_ELECTRODE_1: NORMAL
MDC_IDC_SET_LEADCHNL_RV_SENSING_ANODE_LOCATION_1: NORMAL
MDC_IDC_SET_LEADCHNL_RV_SENSING_ANODE_LOCATION_1: NORMAL
MDC_IDC_SET_LEADCHNL_RV_SENSING_CATHODE_ELECTRODE_1: NORMAL
MDC_IDC_SET_LEADCHNL_RV_SENSING_CATHODE_ELECTRODE_1: NORMAL
MDC_IDC_SET_LEADCHNL_RV_SENSING_CATHODE_LOCATION_1: NORMAL
MDC_IDC_SET_LEADCHNL_RV_SENSING_CATHODE_LOCATION_1: NORMAL
MDC_IDC_SET_LEADCHNL_RV_SENSING_POLARITY: NORMAL
MDC_IDC_SET_LEADCHNL_RV_SENSING_POLARITY: NORMAL
MDC_IDC_SET_LEADCHNL_RV_SENSING_SENSITIVITY: 0.3 MV
MDC_IDC_SET_LEADCHNL_RV_SENSING_SENSITIVITY: 0.3 MV
MDC_IDC_SET_ZONE_DETECTION_BEATS_DENOMINATOR: 40 {BEATS}
MDC_IDC_SET_ZONE_DETECTION_BEATS_DENOMINATOR: 40 {BEATS}
MDC_IDC_SET_ZONE_DETECTION_BEATS_NUMERATOR: 30 {BEATS}
MDC_IDC_SET_ZONE_DETECTION_BEATS_NUMERATOR: 30 {BEATS}
MDC_IDC_SET_ZONE_DETECTION_INTERVAL: 300 MS
MDC_IDC_SET_ZONE_DETECTION_INTERVAL: 300 MS
MDC_IDC_SET_ZONE_DETECTION_INTERVAL: 350 MS
MDC_IDC_SET_ZONE_DETECTION_INTERVAL: 430 MS
MDC_IDC_SET_ZONE_DETECTION_INTERVAL: 430 MS
MDC_IDC_SET_ZONE_DETECTION_INTERVAL: NORMAL
MDC_IDC_SET_ZONE_DETECTION_INTERVAL: NORMAL
MDC_IDC_SET_ZONE_TYPE: NORMAL
MDC_IDC_STAT_AT_BURDEN_PERCENT: 0 %
MDC_IDC_STAT_AT_BURDEN_PERCENT: 0 %
MDC_IDC_STAT_AT_DTM_END: NORMAL
MDC_IDC_STAT_AT_DTM_END: NORMAL
MDC_IDC_STAT_AT_DTM_START: NORMAL
MDC_IDC_STAT_AT_DTM_START: NORMAL
MDC_IDC_STAT_BRADY_AP_VP_PERCENT: 0.05 %
MDC_IDC_STAT_BRADY_AP_VP_PERCENT: 0.05 %
MDC_IDC_STAT_BRADY_AP_VS_PERCENT: 90.27 %
MDC_IDC_STAT_BRADY_AP_VS_PERCENT: 90.27 %
MDC_IDC_STAT_BRADY_AS_VP_PERCENT: 0 %
MDC_IDC_STAT_BRADY_AS_VP_PERCENT: 0 %
MDC_IDC_STAT_BRADY_AS_VS_PERCENT: 9.68 %
MDC_IDC_STAT_BRADY_AS_VS_PERCENT: 9.68 %
MDC_IDC_STAT_BRADY_DTM_END: NORMAL
MDC_IDC_STAT_BRADY_DTM_END: NORMAL
MDC_IDC_STAT_BRADY_DTM_START: NORMAL
MDC_IDC_STAT_BRADY_DTM_START: NORMAL
MDC_IDC_STAT_BRADY_RA_PERCENT_PACED: 90.07 %
MDC_IDC_STAT_BRADY_RA_PERCENT_PACED: 90.07 %
MDC_IDC_STAT_BRADY_RV_PERCENT_PACED: 0.06 %
MDC_IDC_STAT_BRADY_RV_PERCENT_PACED: 0.06 %
MDC_IDC_STAT_EPISODE_RECENT_COUNT: 0
MDC_IDC_STAT_EPISODE_RECENT_COUNT_DTM_END: NORMAL
MDC_IDC_STAT_EPISODE_RECENT_COUNT_DTM_START: NORMAL
MDC_IDC_STAT_EPISODE_TOTAL_COUNT: 0
MDC_IDC_STAT_EPISODE_TOTAL_COUNT: 13
MDC_IDC_STAT_EPISODE_TOTAL_COUNT: 13
MDC_IDC_STAT_EPISODE_TOTAL_COUNT: 2
MDC_IDC_STAT_EPISODE_TOTAL_COUNT: 2
MDC_IDC_STAT_EPISODE_TOTAL_COUNT: 888
MDC_IDC_STAT_EPISODE_TOTAL_COUNT: 888
MDC_IDC_STAT_EPISODE_TOTAL_COUNT_DTM_END: NORMAL
MDC_IDC_STAT_EPISODE_TOTAL_COUNT_DTM_START: NORMAL
MDC_IDC_STAT_EPISODE_TYPE: NORMAL
MDC_IDC_STAT_TACHYTHERAPY_ATP_DELIVERED_RECENT: 0
MDC_IDC_STAT_TACHYTHERAPY_ATP_DELIVERED_RECENT: 0
MDC_IDC_STAT_TACHYTHERAPY_ATP_DELIVERED_TOTAL: 12
MDC_IDC_STAT_TACHYTHERAPY_ATP_DELIVERED_TOTAL: 12
MDC_IDC_STAT_TACHYTHERAPY_RECENT_DTM_END: NORMAL
MDC_IDC_STAT_TACHYTHERAPY_RECENT_DTM_END: NORMAL
MDC_IDC_STAT_TACHYTHERAPY_RECENT_DTM_START: NORMAL
MDC_IDC_STAT_TACHYTHERAPY_RECENT_DTM_START: NORMAL
MDC_IDC_STAT_TACHYTHERAPY_SHOCKS_ABORTED_RECENT: 0
MDC_IDC_STAT_TACHYTHERAPY_SHOCKS_ABORTED_RECENT: 0
MDC_IDC_STAT_TACHYTHERAPY_SHOCKS_ABORTED_TOTAL: 4
MDC_IDC_STAT_TACHYTHERAPY_SHOCKS_ABORTED_TOTAL: 4
MDC_IDC_STAT_TACHYTHERAPY_SHOCKS_DELIVERED_RECENT: 0
MDC_IDC_STAT_TACHYTHERAPY_SHOCKS_DELIVERED_RECENT: 0
MDC_IDC_STAT_TACHYTHERAPY_SHOCKS_DELIVERED_TOTAL: 4
MDC_IDC_STAT_TACHYTHERAPY_SHOCKS_DELIVERED_TOTAL: 4
MDC_IDC_STAT_TACHYTHERAPY_TOTAL_DTM_END: NORMAL
MDC_IDC_STAT_TACHYTHERAPY_TOTAL_DTM_END: NORMAL
MDC_IDC_STAT_TACHYTHERAPY_TOTAL_DTM_START: NORMAL
MDC_IDC_STAT_TACHYTHERAPY_TOTAL_DTM_START: NORMAL

## 2021-03-26 NOTE — TELEPHONE ENCOUNTER
----- Message from Michael Boyd MD sent at 3/26/2021 11:29 AM CDT -----  Dear Fidel    Your results were within the acceptable range. Your optic nerve damage is not from sarcoid.  You should continue the prednisone as prescribed by your cardiologist.  Could I please have you schedule a follow up appointment in about 4-6 weeks.     Please call my office right away if your symptoms worsen.       Thank you for allowing me to share in your care.     Michael Boyd MD  3/26/2021  11:27 AM

## 2021-03-26 NOTE — TELEPHONE ENCOUNTER
Left voicemail for patient asking him to call me to discuss results of MRI as below.     Stephanie Dimas on 3/26/2021 at 11:35 AM

## 2021-03-29 NOTE — TELEPHONE ENCOUNTER
Spoke to patient. Dr. Boyd would like patient to discuss with prescribing provider the prednisone since this is not due to sarcoid, which patient says was prescribed by Dr. Rashel Oden.  Results faxed to Dr. Oden's office     Stephanie Dimas on 3/29/2021 at 8:55 AM

## 2021-03-30 NOTE — TELEPHONE ENCOUNTER
Spoke to patient.  Lesion in frontal lobe has been there for a long time and just an abnormal area of blood vessels.  It is not concerning and nothing further needs to be done about it.  He can taper prednisone per Dr. Oden.    Stephanie Dimas on 3/30/2021 at 1:47 PM

## 2021-04-19 ENCOUNTER — TELEPHONE (OUTPATIENT)
Dept: OPHTHALMOLOGY | Facility: CLINIC | Age: 61
End: 2021-04-19

## 2021-04-19 NOTE — TELEPHONE ENCOUNTER
[facility] Call Center    Phone Message    May a detailed message be left on voicemail: yes     Reason for Call: Other: Pt calling in he would like to know if he should wear a eye patch or not, please call back to discuss further     Action Taken: Message routed to:  [facility] eye     Travel Screening: Not Applicable

## 2021-04-19 NOTE — TELEPHONE ENCOUNTER
I spoke to the patient who questions if he should wear an eye patch to rest his eye to help curtail the inflammation.  He may try a patch if it will help his vision feel better  He doesn't complain of double vision or eye fatigue.

## 2021-04-20 NOTE — TELEPHONE ENCOUNTER
I spoke to the patient a second time confirming that the patch will not help the healing process.  The patch won't hurt him if his vision feels better to wear it.  The patient expressed understanding

## 2021-05-03 ENCOUNTER — RECORDS - HEALTHEAST (OUTPATIENT)
Dept: ADMINISTRATIVE | Facility: OTHER | Age: 61
End: 2021-05-03

## 2021-05-05 ENCOUNTER — OFFICE VISIT (OUTPATIENT)
Dept: OPHTHALMOLOGY | Facility: CLINIC | Age: 61
End: 2021-05-05
Payer: COMMERCIAL

## 2021-05-05 DIAGNOSIS — H53.40 VISUAL FIELD DEFECT: Primary | ICD-10-CM

## 2021-05-05 PROCEDURE — 99213 OFFICE O/P EST LOW 20 MIN: CPT | Performed by: OPHTHALMOLOGY

## 2021-05-05 PROCEDURE — 92133 CPTRZD OPH DX IMG PST SGM ON: CPT | Performed by: OPHTHALMOLOGY

## 2021-05-05 ASSESSMENT — SLIT LAMP EXAM - LIDS
COMMENTS: NORMAL
COMMENTS: NORMAL

## 2021-05-05 ASSESSMENT — CONF VISUAL FIELD
OD_SUPERIOR_TEMPORAL_RESTRICTION: 2
OD_SUPERIOR_NASAL_RESTRICTION: 2
METHOD: COUNTING FINGERS
OS_NORMAL: 1

## 2021-05-05 ASSESSMENT — TONOMETRY
OD_IOP_MMHG: 10
IOP_METHOD: ICARE
OS_IOP_MMHG: 10

## 2021-05-05 ASSESSMENT — VISUAL ACUITY
OD_SC: 20/100
OS_SC: 20/20
METHOD: SNELLEN - LINEAR

## 2021-05-05 NOTE — PROGRESS NOTES
Assessment & Plan     Fidel Woo is a 60 year old male with the following diagnoses:   1. Visual field defect         60 year old man presented for follow up bilateral optic disc swelling that was worse in the right eye. He was taking amiodarone back then that was stopped after the appointment 3/24/2021. He also has biopsy proven cardiac sarcoidosis that is still active on the most recent  PET CT metabolic with perfusion, currently in mycophenolate. He feels that there is more light coming through his VF defect    His visual acuity is 20/100 in the right eye and 20/20 in the left eye. Pupil is sluggish in the right eye. Color plates 0/11 in the right eye and full in left    Automated VF: improvement in both eyes  OCT optic nerves: improvement in RNFL thickening    My impression is that he has bilateral optic neuropathy worse in the right eye likely from amiodarone. Follow up in 4 months.           Attending Physician Attestation:  Complete documentation of historical and exam elements from today's encounter can be found in the full encounter summary report (not reduplicated in this progress note).  I personally obtained the chief complaint(s) and history of present illness.  I confirmed and edited as necessary the review of systems, past medical/surgical history, family history, social history, and examination findings as documented by others; and I examined the patient myself.  I personally reviewed the relevant tests, images, and reports as documented above.  I formulated and edited as necessary the assessment and plan and discussed the findings and management plan with the patient and family. I personally reviewed the ophthalmic test(s) associated with this encounter, agree with the interpretation(s) as documented by the resident/fellow, and have edited the corresponding report(s) as necessary.  - Michael Cote MD  Neuro-ophthalmology fellow  [facility]

## 2021-05-16 ENCOUNTER — HEALTH MAINTENANCE LETTER (OUTPATIENT)
Age: 61
End: 2021-05-16

## 2021-05-28 ENCOUNTER — RECORDS - HEALTHEAST (OUTPATIENT)
Dept: ADMINISTRATIVE | Facility: CLINIC | Age: 61
End: 2021-05-28

## 2021-05-30 VITALS — HEIGHT: 71 IN | WEIGHT: 166 LBS | BODY MASS INDEX: 23.24 KG/M2

## 2021-05-30 VITALS — HEIGHT: 71 IN | BODY MASS INDEX: 23.94 KG/M2 | WEIGHT: 171 LBS

## 2021-06-01 VITALS — BODY MASS INDEX: 23.66 KG/M2 | WEIGHT: 169 LBS | HEIGHT: 71 IN

## 2021-06-02 VITALS — WEIGHT: 171 LBS | BODY MASS INDEX: 24.19 KG/M2

## 2021-06-02 NOTE — TELEPHONE ENCOUNTER
RN cannot approve Refill Request    RN can NOT refill this medication med is not covered by policy/route to provider. Last office visit: Visit date not found Last Physical: 8/30/2018 Last MTM visit: Visit date not found Last visit same specialty: Visit date not found.  Next visit within 3 mo: Visit date not found  Next physical within 3 mo: Visit date not found      Abimbola Lee, [facility] Triage/Med Refill 10/27/2019    Requested Prescriptions   Pending Prescriptions Disp Refills     nitroglycerin (NITROSTAT) 0.4 MG SL tablet [Pharmacy Med Name: NITROGLYCERIN 0.4MG SUB TAB 25] 25 tablet 0     Sig: PLACE 1 TABLET UNDER THE TONGUE EVERY 5 MINUTES AS NEEDED FOR CHEST PAIN. IF NO RELIEF AFTER 3 DOSES CALL 911.       There is no refill protocol information for this order

## 2021-06-04 NOTE — TELEPHONE ENCOUNTER
Patient notified of clinician's message and verbalized understanding. No further questions at this time.   Kaur Bobby CMA ............... 4:25 PM, 12/16/19

## 2021-06-04 NOTE — TELEPHONE ENCOUNTER
Test Results  Who is calling?:  Patient   Who ordered the test:  Yang Fletcher PA-C  Type of test: Other: MRI  Date of test:  12/11/19  Where was the test performed:  [facility]   What are your questions/concerns?:  Patient requesting for results.  Okay to leave a detailed message?:  No  Patient states he is not able to see MRI results through my shanell.

## 2021-06-05 VITALS
SYSTOLIC BLOOD PRESSURE: 110 MMHG | WEIGHT: 169 LBS | HEART RATE: 68 BPM | BODY MASS INDEX: 23.66 KG/M2 | HEIGHT: 71 IN | DIASTOLIC BLOOD PRESSURE: 78 MMHG

## 2021-06-07 NOTE — TELEPHONE ENCOUNTER
RN cannot approve Refill Request    RN can NOT refill this medication med is not covered by policy/route to provider     . Last office visit: Visit date not found Last Physical: 8/30/2018 Last MTM visit: Visit date not found Last visit same specialty: Visit date not found.  Next visit within 3 mo: Visit date not found  Next physical within 3 mo: Visit date not found      Arminda Augustin, [facility] Triage/Med Refill 4/2/2020    Requested Prescriptions   Pending Prescriptions Disp Refills     nitroglycerin (NITROSTAT) 0.4 MG SL tablet [Pharmacy Med Name: NITROGLYCERIN 0.4MG SUB TAB 25] 25 tablet 6     Sig: PLACE 1 TABLET UNDER THE TONGUE EVERY 5 MINUTES AS NEEDED FOR CHEST PAIN. IF NO RELIEF AFTER 3 DOSES CALL 911.       There is no refill protocol information for this order

## 2021-06-08 NOTE — PROGRESS NOTES
ASSESSMENT:     Physical exam with the following:    #1.  Worsening life stressors.    #2.  Hyperlipidemia.    #3.  Coronary artery disease, stable.    #4.  BPH, stable.    #5.  Healthcare maintenance    #6.  Hearing loss    PLAN:  Check a fasting lipid profile, TSH, CMP, and PSA.  I will refer him to audiology as well.  He was given his tetanus vaccine.  Other vaccines are up-to-date.  Colon cancer screening is up-to-date.  Follow up 1 year, earlier if needed.  There are no Patient Instructions on file for this visit.    Orders Placed This Encounter   Procedures     Td, Preservative Free     Lipid Cascade     Order Specific Question:   Fasting is required?     Answer:   Yes     Thyroid Stimulating Hormone (TSH)     Comprehensive Metabolic Panel     PSA (Prostatic-Specific Antigen), Annual Screen     Ambulatory referral to Audiology     Referral Priority:   Routine     Referral Type:   Audiology     Referral Reason:   Evaluation and Treatment     Requested Specialty:   Audiology     Number of Visits Requested:   1     Medications Discontinued During This Encounter   Medication Reason     omeprazole (PRILOSEC) 20 MG capsule Therapy completed       No Follow-up on file.    ASSESSED PROBLEMS:  Problem List Items Addressed This Visit     Coronary Artery Disease    Hyperlipidemia      Other Visit Diagnoses     Routine general medical examination at a health care facility    -  Primary    Relevant Orders    Lipid Cascade    Thyroid Stimulating Hormone (TSH)    Comprehensive Metabolic Panel    PSA (Prostatic-Specific Antigen), Annual Screen    Hearing loss        Relevant Orders    Ambulatory referral to Audiology          CHIEF COMPLAINT:  Chief Complaint   Patient presents with     Annual Exam     fasting       HISTORY OF PRESENT ILLNESS:  Fidel Woo is a 56 y.o. male presenting to the clinic today for his annual physical exam.     Hearing difficulty: He has developed difficulty hearing while in rooms with  significant background noise in recent months. He denies hearing difficulty while having one-on-one conversations in a quiet room. He requests receiving a referral to an audiologist for further workup of his hearing difficulty.     Depression: He attempted a trial of Celexa 20 mg daily for his depression symptoms after his last annual physical exam on 1/29/16. He discontinued Celexa after being on the medication for one week, secondary to developing erectile dysfunction symptoms and brain fog while on the medication. He has continued to experience depression symptoms over the last year. He requests a referral to consult with a therapist in the upcoming month. He inquires if he should establish care with a psychiatrist or a psychologist, given his depression and stress symptoms. He notes that he has previously followed with a psychologist at [facility] but has since been informed that his insurance no longer covers [facility].     Coronary artery disease: He is due for follow up with Dr. Suárez in cardiology for his coronary artery disease. He has a notable history of myocardial infarction in 2010. He was instructed to follow up with Dr. Suárez in 2 years at his last cardiology visit on 11/28/14.     Medication management: He is taking all of his medications, as prescribed. He is tolerating his medications well and denies any side effects. He inquires if herbal supplement consumption may provide improvement in his urinary symptoms. He is agreeable to attempting saw palmetto supplement consumption for treatment of his urinary symptoms.     Health maintenance: He is due to receive an updated tetanus immunization. He received his annual flu immunization last fall. His colon cancer screening is up to date.     REVIEW OF SYSTEMS:   He has continued to experience concentration difficulty, frequent fatigue, and and difficulty staying warm over the last year. Of note, his TSH was normal when last monitored at 1.70  at his last annual physical exam on 1/28/16. He inquires if he should receive updated TSH testing at this time. He has developed some dysuria over the last year, specifically noting that his urinary stream starts and stops twice on average while emptying his bladder. He wakes one time per night on average for nocturia. His daytime wakefulness is not currently reduced as a result of his nocturia. He notes that caffeine consumption significantly aggravates his urinary symptoms. His urinary symptoms are currently tolerable. He has developed left elbow pain in recent months, after beginning swimming on a regular basis at his local gym. He requests exercises to treat his elbow pain. He inquires if he would benefit from receiving cortisol level testing in his current blood work. All other systems are negative.    PFSH:  He has experienced elevated stress in recent months secondary to life and work stressors. He has a family history of hypothyroidism in his mother and sister.     ILNESSES, HOSPITALIZATIONS, AND OPERATIONS:     #1. Coronary artery disease, status post stent placement in 2010 next     #2. BPH     #3. Hyperlipidemia     #4. History of iron deficiency anemia, unknown etiology. Colonoscopy and endoscopy in October 2014 were normal     #5. Status post left total knee replacement     #6. Status post cyst excision on testicle.    Family History   Problem Relation Age of Onset     Hypothyroidism Mother      Hypothyroidism Sister      Social History     Social History     Marital status:      Spouse name: N/A     Number of children: N/A     Years of education: N/A     Occupational History     Not on file.     Social History Main Topics     Smoking status: Former Smoker     Smokeless tobacco: Not on file     Alcohol use Not on file     Drug use: Not on file     Sexual activity: Not on file     Other Topics Concern     Not on file     Social History Narrative     Past Surgical History   Procedure Laterality  "Date     Pr excis testis extraparenchymal lesion       Description: Surgery Testis Excision Of Local Lesion Right;  Recorded: 02/20/2009;     Pr repair cruciate ligament,knee Left      Description: Primary Repair Of Knee Ligament Cruciate Anterior;  Recorded: 02/20/2009;     Total knee arthroplasty Left        No Known Allergies    VITALS:  Vitals:    01/31/17 0901   BP: 126/84   Patient Site: Right Arm   Patient Position: Sitting   Cuff Size: Adult Regular   Pulse: 72   Weight: 166 lb (75.3 kg)   Height: 5' 10.5\" (1.791 m)     Wt Readings from Last 3 Encounters:   01/31/17 166 lb (75.3 kg)   01/28/16 167 lb 8 oz (76 kg)   03/04/15 164 lb (74.4 kg)       PHYSICAL EXAM:  Constitutional:  Reveals an alert and oriented x3, pleasant male with no acute distress.   HEENT: Pupils are equal, round, and reactive to light. Oropharynx is clear. Ears: External canals, TMs clear.   LYMPHATIC: No anterior or posterior lymphadenopathy   CV: S1, S2, regular rate and rhythm, no murmurs, gallops, or rubs   LUNGS: Clear to auscultation bilaterally   ABDOMEN: Soft, non-tender, non-distended   EXTREMITIES: No pedal swelling bilaterally       ADDITIONAL HISTORY SUMMARIZED (FROM OLD RECORDS OR HISTORY FROM SOMEONE OTHER THAN THE PATIENT OR ANOTHER HEALTHCARE PROVIDER) (2 TOTAL): None.     DECISION TO OBTAIN EXTRA INFORMATION (OLD RECORDS REQUESTED OR HISTORY FROM ANOTHER PERSON OR ACCESSING CARE EVERYWHERE) (1 TOTAL): None.     RADIOLOGY TESTS SUMMARIZED OR ORDERED (XRAY/CT/MRI/DXA) (1 TOTAL): None.    LABS REVIEWED OR ORDERED (1 TOTAL): Labs ordered and reviewed.    MEDICINE TESTS SUMMARIZED OR ORDERED (EKG/ECHO/EGD) (1 TOTAL): Audiology testing ordered.    INDEPENDENT REVIEW OF EKG OR X-RAY (2 EACH): None.       The visit lasted a total of 29 minutes face to face with the patient. Over 50% of the time was spent counseling and educating the patient about health maintenance.    Adeel LESLIE, am scribing for and in the presence of, Dr." Robles Padilla.    I, Dr. Robles Padilla, personally performed the services described in this documentation, as scribed by Adeel Duggan in my presence, and it is both accurate and complete.    MEDICATIONS:  Current Outpatient Prescriptions   Medication Sig Dispense Refill     aspirin 81 MG EC tablet 81 mg daily.        cephalexin (KEFLEX) 500 MG capsule TAKE ONE CAPSULE BY MOUTH FOUR TIMES DAILY THE DAY OF AND THE DAY AFTER DENTAL PROCEDURE 8 capsule 0     fluticasone (FLONASE) 50 mcg/actuation nasal spray 2 sprays into each nostril daily.       GLUC/DENNIS-MSM#1/VIT C/CHUCK/BOR (GLUCOSAMINE-CHOND-MSM COMPLEX ORAL) 1-3 tablets daily.       lisinopril (PRINIVIL,ZESTRIL) 5 MG tablet Take 1 tablet (5 mg total) by mouth daily. 90 tablet 3     MELATONIN ORAL 1 tablet bedtime as needed. 10 MG oral capsules       nitroglycerin (NITROSTAT) 0.4 MG SL tablet Place 1 tablet (0.4 mg total) under the tongue every 5 (five) minutes as needed for chest pain. If no relief after 3 doses call 911 25 tablet 0     simvastatin (ZOCOR) 20 MG tablet Take 1 tablet (20 mg total) by mouth bedtime. 90 tablet 3     No current facility-administered medications for this visit.        Total data points: 2

## 2021-06-09 NOTE — PROGRESS NOTES
[facility] Office Note    Assessment / Plan:    1.  Coronary artery disease.  His general sense of fatigue, but with a stable exercise tolerance, is unlikely to be related to coronary artery disease.  We did discuss possibility of stress testing but will not pursue it at this time.  2.  Dyslipidemia on treatment.  With target LDL being below 70 we will increase his simvastatin to 40 mg daily    Plan follow up one year    ______________________________________________________________________    Subjective:    I had the opportunity to see Fidel Woo at the [facility]. Fidel Woo is a 56 y.o. male with a history of coronary artery disease status post bare-metal stent placement in 2010, by report though I have not reviewed the records.  He also has a history of hyperlipidemia and has been on simvastatin therapy.  He returns for routine follow-up last saw him in November 2014.    Over the last 2 years he has noticed that his blood pressures stayed under good control is no longer taking lisinopril.  He's had no problems with his medications but he feels gradually progressive decrease in his alertness.  He feels some increased fatigue, drinking more coffee during the daytime.  He has not experienced any chest pains with exertion and he does workout on the elliptical  for about 20 minutes 2-3 days of the week.  He walks to and from the [facility] as well.  His sleep is generally restorative although he occasionally awakens with a dry mouth and in a sweat he's not been told that he snores does not have daytime sleepiness.  He has had occasional twinges of chest pain but nothing that is related to exertion nothing that is sustained and duration.    His weight is stable.  He wonders if there is potentially some contribution of depression to his symptoms.  He feels that this is a long-standing problem for  him    ______________________________________________________________________    Problem List:  Patient Active Problem List   Diagnosis     Coronary Artery Disease     Benign Prostatic Hypertrophy     Hemorrhoids     Hyperlipidemia     Medical History:  Past Medical History:   Diagnosis Date     Coronary artery disease      Hyperlipidemia      Surgical History:  Past Surgical History:   Procedure Laterality Date     CORONARY STENT PLACEMENT  2010    [facility]:  BMS to LAD     PA EXCIS TESTIS EXTRAPARENCHYMAL LESION      Description: Surgery Testis Excision Of Local Lesion Right;  Recorded: 02/20/2009;     PA REPAIR CRUCIATE LIGAMENT,KNEE Left     Description: Primary Repair Of Knee Ligament Cruciate Anterior;  Recorded: 02/20/2009;     TOTAL KNEE ARTHROPLASTY Left      Social History:  Social History     Social History     Marital status:      Spouse name: N/A     Number of children: N/A     Years of education: N/A     Occupational History     Not on file.     Social History Main Topics     Smoking status: Former Smoker     Smokeless tobacco: Not on file     Alcohol use Not on file     Drug use: Not on file     Sexual activity: Not on file     Other Topics Concern     Not on file     Social History Narrative     Sleep History:  Restorative, but occasionally awakens with a dry mouth or sweats.  No snoring or apnea has been reported by his wife, a nurse.  Exercise History:  Walks to and from the [facility] where he works out 20 minutes on elliptical  2-3 days per week, also plays tennis vigorously    Review of Systems:   General: WNL  Eyes: WNL  Ears/Nose/Throat: WNL  Lungs: WNL  Heart: WNL  Stomach: WNL  Bladder: WNL  Muscle/Joints: WNL  Skin: WNL     Mental Health: WNL     Blood: WNL          Family History:  Family History   Problem Relation Age of Onset     Hypothyroidism Mother      Hypothyroidism Sister          Allergies:  No Known Allergies  Medications:  Current Outpatient Prescriptions   Medication Sig  "Dispense Refill     aspirin 81 MG EC tablet 81 mg daily.        cephalexin (KEFLEX) 500 MG capsule TAKE ONE CAPSULE BY MOUTH FOUR TIMES DAILY THE DAY OF AND THE DAY AFTER DENTAL PROCEDURE 8 capsule 0     fluticasone (FLONASE) 50 mcg/actuation nasal spray 2 sprays into each nostril daily.       GLUC/DENNIS-MSM#1/VIT C/CHUCK/BOR (GLUCOSAMINE-CHOND-MSM COMPLEX ORAL) 1-3 tablets daily.       MELATONIN ORAL 1 tablet bedtime as needed. 10 MG oral capsules       nitroglycerin (NITROSTAT) 0.4 MG SL tablet Place 1 tablet (0.4 mg total) under the tongue every 5 (five) minutes as needed for chest pain. If no relief after 3 doses call 911 25 tablet 0     simvastatin (ZOCOR) 20 MG tablet Take 1 tablet (20 mg total) by mouth bedtime. 90 tablet 3     No current facility-administered medications for this visit.        Objective:   Wt Readings from Last 3 Encounters:   02/20/17 171 lb (77.6 kg)   01/31/17 166 lb (75.3 kg)   01/28/16 167 lb 8 oz (76 kg)     Vital signs:  Visit Vitals     /70 (Patient Site: Left Arm, Patient Position: Sitting, Cuff Size: Adult Large)     Pulse 64     Resp 14     Ht 5' 10.5\" (1.791 m)     Wt 171 lb (77.6 kg)     BMI 24.19 kg/m2         Physical Exam:    Eyes   Conjunctiva: Injected bilaterally   Sclerae: Clear, nonicteric.   ENT   Mouth: Oral mucuous membranes are pink and moist   Neck   Carotid pulses: Carotid pulses palpaple with normal contours and symmetric, no bruits.   Jugular Veins: Normal jugular venous pressure.   Thyroid: No visible thyromegaly.   Pulmonary   Examination of lungs: Clear to auscultation, no crackles, or wheezing.   Cardiovascular   The rhythm was regular. Heart sounds: normal S1, normal S2 and no gallop heard. No murmurs were heard.   Extremities: No edema or clubbing.   Gastrointestinal   Abdomen: Non-tender, no masses noted.   Musculoskeletal   Spine: spine straight upon visual inspection.   Skin   Skin and subcutaneous tissue: No xanthelasma.   Neurologic   Orientation " to person, place and time: Normal.   Psychiatric   Mood and affect: Normal.     Lab Results:  LIPIDS:  Lab Results   Component Value Date    CHOL 151 01/31/2017    CHOL 152 01/28/2016    CHOL 141 03/04/2015     Lab Results   Component Value Date    HDL 54 01/31/2017    HDL 59 01/28/2016    HDL 52 03/04/2015     Lab Results   Component Value Date    LDLCALC 82 01/31/2017    LDLCALC 78 01/28/2016    LDLCALC 76 03/04/2015     Lab Results   Component Value Date    TRIG 76 01/31/2017    TRIG 75 01/28/2016    TRIG 63 03/04/2015       Echocardiogram 2014:   Summary  Normal left ventricular size and systolic function.  Left ventricular ejection fraction is calculated to be 62%.  Mild aortic regurgitation is noted.  Borderline dilation of aortic root    ECG today shows sinus rhythm with sinus arrhythmia and incomplete right bundle branch block with nonspecific inferior T-wave changes        GEORGINA PATE MD [facility]  [phone]    This note created using Dragon voice recognition software.  Sound alike errors may have escaped editing.

## 2021-06-20 NOTE — PROGRESS NOTES
[facility] Office Note    Assessment / Plan:    1.  Coronary artery disease.  Stable without anginal symptoms.  Encouraged to resume regular moderate exercise regimen  2.  Dyslipidemia on treatment.  With possible muscle aches on higher dose simvastatin, will switch to atorvastatin 20 mg daily.    Plan follow up 2 years    ______________________________________________________________________    Subjective:    I had the opportunity to see Fidel Woo in follow-up today at the [facility]. Fidel Woo is a 58 y.o. male with a history of coronary artery disease status post bare-metal stent placement in 2010, by report.  He also has a history of hyperlipidemia and has been on simvastatin therapy.  His dose was increased to 40 mg daily a year ago, he thought this might be causing some muscle cramping and cut himself back to 20 mg daily.  He has otherwise felt well.  He has been working on his deck and so not exercising as vigorously as he once was.  His weight has been stable.  He has not experienced any chest pains or palpitations.  His blood pressure has been well controlled.  He sleeps well and awakens somewhat slowly, though his wife reports no snoring or apnea.    He otherwise feels well and offers no complaints today.    ______________________________________________________________________    Problem List:  Patient Active Problem List   Diagnosis     Coronary Artery Disease     Benign Prostatic Hypertrophy     Hemorrhoids     Hyperlipidemia     Medical History:  Past Medical History:   Diagnosis Date     Coronary artery disease      Hyperlipidemia      Surgical History:  Past Surgical History:   Procedure Laterality Date     CORONARY STENT PLACEMENT  2010    [facility]:  BMS to LAD     MO EXCIS TESTIS EXTRAPARENCHYMAL LESION      Description: Surgery Testis Excision Of Local Lesion Right;  Recorded: 02/20/2009;     MO REPAIR CRUCIATE LIGAMENT,KNEE Left     Description: Primary  Repair Of Knee Ligament Cruciate Anterior;  Recorded: 02/20/2009;     TOTAL KNEE ARTHROPLASTY Left      Social History:  Social History     Social History     Marital status:      Spouse name: N/A     Number of children: N/A     Years of education: N/A     Occupational History     Not on file.     Social History Main Topics     Smoking status: Former Smoker     Smokeless tobacco: Never Used     Alcohol use 0.6 oz/week     1 Shots of liquor per week     Drug use: No     Sexual activity: Not on file     Other Topics Concern     Not on file     Social History Narrative     Sleep History:  Restorative, but slow to awaken.  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    Review of Systems:   General: WNL  Eyes: WNL  Ears/Nose/Throat: WNL  Lungs: WNL  Heart: WNL  Stomach: WNL  Bladder: WNL  Muscle/Joints: WNL  Skin: WNL  Nervous System: WNL  Mental Health: WNL     Blood: WNL          Family History:  Family History   Problem Relation Age of Onset     Hypothyroidism Mother      Hypothyroidism Sister      Heart disease Paternal Uncle          Allergies:  No Known Allergies  Medications:  Current Outpatient Prescriptions   Medication Sig Dispense Refill     aspirin 81 MG EC tablet 81 mg daily.        fluticasone (FLONASE) 50 mcg/actuation nasal spray 2 sprays into each nostril daily.       GLUC/DENNIS-MSM#1/VIT C/CHUCK/BOR (GLUCOSAMINE-CHOND-MSM COMPLEX ORAL) 1-3 tablets daily.       nitroglycerin (NITROSTAT) 0.4 MG SL tablet Place 1 tablet (0.4 mg total) under the tongue every 5 (five) minutes as needed for chest pain. If no relief after 3 doses call 911 25 tablet 0     simvastatin (ZOCOR) 40 MG tablet Take 1 tablet (40 mg total) by mouth at bedtime. (Patient taking differently: Take 40 mg by mouth at bedtime. ) 90 tablet 1     MELATONIN ORAL 1 tablet bedtime as needed. 10 MG oral capsules       No current facility-administered  medications for this visit.        Objective:   Wt Readings from Last 3 Encounters:   09/13/18 171 lb (77.6 kg)   08/30/18 169 lb (76.7 kg)   02/20/17 171 lb (77.6 kg)     Vital signs:  /68 (Patient Site: Right Arm, Patient Position: Sitting, Cuff Size: Adult Large)  Pulse 64  Resp 16  Wt 171 lb (77.6 kg)  BMI 24.19 kg/m2      Physical Exam:    Eyes   Conjunctiva: Clear bilaterally   Sclerae: Clear, nonicteric.   ENT   Mouth: Oral mucuous membranes are pink and moist   Neck   Carotid pulses: Carotid pulses palpaple with normal contours and symmetric, no bruits.   Jugular Veins: Normal jugular venous pressure.   Thyroid: No visible thyromegaly.   Pulmonary   Examination of lungs: Clear to auscultation, no crackles, or wheezing.   Cardiovascular   The rhythm was regular. Heart sounds: normal S1, normal S2 and no gallop heard. No murmurs were heard.   Extremities: No edema or clubbing.   Gastrointestinal   Abdomen: Non-tender, no masses noted.   Musculoskeletal   Spine: spine straight upon visual inspection.   Skin   Skin and subcutaneous tissue: No xanthelasma.   Neurologic   Orientation to person, place and time: Normal.   Psychiatric   Mood and affect: Normal.     Lab Results:  LIPIDS:  Lab Results   Component Value Date    CHOL 151 01/31/2017    CHOL 152 01/28/2016    CHOL 141 03/04/2015     Lab Results   Component Value Date    HDL 54 01/31/2017    HDL 59 01/28/2016    HDL 52 03/04/2015     Lab Results   Component Value Date    LDLCALC 82 01/31/2017    LDLCALC 78 01/28/2016    LDLCALC 76 03/04/2015     Lab Results   Component Value Date    TRIG 76 01/31/2017    TRIG 75 01/28/2016    TRIG 63 03/04/2015       Echocardiogram 2014:   Summary  Normal left ventricular size and systolic function.  Left ventricular ejection fraction is calculated to be 62%.  Mild aortic regurgitation is noted.  Borderline dilation of aortic root          GEORGINA PATE MD [facility]  [phone]    This note  created using Dragon voice recognition software.  Sound alike errors may have escaped editing.

## 2021-06-20 NOTE — PROGRESS NOTES
Fidel comes in today for a physical exam.  A complete review of systems is undertaken was negative unless noted below.    ILNESSES, HOSPITALIZATIONS, AND OPERATIONS:      #1. Coronary artery disease, status post stent placement in        #2. BPH      #3. Hyperlipidemia      #4. Status post left total knee replacement      #5. Status post cyst excision on testicle    Fidel feeling well today.  He has a wart on his right foot that he wants me to look at.  He is doing some IT band stretches and wanted to make sure that he was doing the correct ones.  He showed me them and I reassured him that these were correct.  He has been having some right shoulder pain but this is improving.  He is otherwise well.    OBJECTIVE:    In general the patient is alert pleasant and in no acute distress.    HEENT: Pupils equal round reactive light.  Oropharynx is clear.  Lymphatic shows no anterior posterior cervical lymphadenopathy.  No thyromegaly or other masses noted.    Cardiovascular: S1-S2 regular in rhythm no murmurs gallops rubs    Lungs are clear    Abdomen is soft nontender nondistended.  No HSM.    Extremities show no pedal edema present bilaterally.  DP pulses are 2+ and normal bilaterally.    Skin exam shows no concerning skin lesions.  There is a 2 x 2 centimeter plantar wart on the bottom of the right foot.    Assessment and plan: Physical exam along with the followin.  Plantar wart.  This was treated with 5 freeze thaw cycles of cryotherapy after being pared down with a 10 blade.  Follow-up as needed.  2.  Healthcare maintenance.  I placed orders for a CMP, lipid profile, and a PSA.  Vaccinations are up-to-date.  Colon cancer screening is up-to-date.  Follow-up 1 year, earlier if needed.

## 2021-09-05 ENCOUNTER — HEALTH MAINTENANCE LETTER (OUTPATIENT)
Age: 61
End: 2021-09-05

## 2021-09-07 ENCOUNTER — TELEPHONE (OUTPATIENT)
Dept: OPHTHALMOLOGY | Facility: CLINIC | Age: 61
End: 2021-09-07

## 2021-09-08 ENCOUNTER — OFFICE VISIT (OUTPATIENT)
Dept: OPHTHALMOLOGY | Facility: CLINIC | Age: 61
End: 2021-09-08
Payer: COMMERCIAL

## 2021-09-08 DIAGNOSIS — H53.40 VISUAL FIELD DEFECT: Primary | ICD-10-CM

## 2021-09-08 DIAGNOSIS — H46.9 OPTIC NEUROPATHY: Primary | ICD-10-CM

## 2021-09-08 DIAGNOSIS — H53.40 VISUAL FIELD DEFECT: ICD-10-CM

## 2021-09-08 PROCEDURE — 92133 CPTRZD OPH DX IMG PST SGM ON: CPT | Performed by: OPHTHALMOLOGY

## 2021-09-08 PROCEDURE — 92014 COMPRE OPH EXAM EST PT 1/>: CPT | Performed by: OPHTHALMOLOGY

## 2021-09-08 PROCEDURE — 92083 EXTENDED VISUAL FIELD XM: CPT | Performed by: OPHTHALMOLOGY

## 2021-09-08 RX ORDER — SOTALOL HYDROCHLORIDE 160 MG/1
160 TABLET ORAL
COMMUNITY
Start: 2021-09-03

## 2021-09-08 ASSESSMENT — SLIT LAMP EXAM - LIDS
COMMENTS: MILD BLEPHARITIS
COMMENTS: MILD BLEPHARITIS

## 2021-09-08 ASSESSMENT — VISUAL ACUITY
OS_SC: 20/20
OD_SC: 20/80
OD_SC+: +2
METHOD: SNELLEN - LINEAR

## 2021-09-08 ASSESSMENT — EXTERNAL EXAM - RIGHT EYE: OD_EXAM: NORMAL

## 2021-09-08 ASSESSMENT — EXTERNAL EXAM - LEFT EYE: OS_EXAM: NORMAL

## 2021-09-08 ASSESSMENT — TONOMETRY
IOP_METHOD: ICARE
OD_IOP_MMHG: 7
OS_IOP_MMHG: 7

## 2021-09-08 ASSESSMENT — CUP TO DISC RATIO
OD_RATIO: 0.3
OS_RATIO: 0.3

## 2021-09-08 ASSESSMENT — CONF VISUAL FIELD
OD_SUPERIOR_TEMPORAL_RESTRICTION: 2
OD_SUPERIOR_NASAL_RESTRICTION: 2

## 2021-09-08 NOTE — PROGRESS NOTES
Assessment & Plan     Fidel Woo is a 61 year old male with the following diagnoses:   1. Optic neuropathy    2. 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  He stopped amiodarone after 3/24/21.  He also has biopsy proven cardiac sarcoidosis.  .Last visit he had improved optic disc edema both eyes.   He thinks he may be a little improved in the RIGHT eye and no change in the left eye.      Visual acuity 20/80 RIGHT eye and 20/20 left eye.  Color vision 0/11 RIGHT eye and 11/11 left eye.  He has moderate pallor RIGHT eye and normal left eye.  Visual field stable both eyes.  Optical coherence tomography retinal nerve fiber layer showed increased thinning RIGHT eye and normal/stable left eye     It is my impression that patient has optic neuropathy right eye greater than left eye due to amiodarone.  Overall, it does not appear that he has any damage to the left eye at all.  While he had optic disc edema in the left eye at presentation, this has resolved.  His nerve fiber layer is normal in the left eye.  His visual acuity is improved today in the RIGHT eye.  Optical coherence tomography showed worse thinning RIGHT eye that is expected progression from an insult like this.  Follow up in 6 months or sooner as needed for worsening symptoms.  Monocular precautions discussed.            Attending Physician Attestation:  Complete documentation of historical and exam elements from today's encounter can be found in the full encounter summary report (not reduplicated in this progress note).  I personally obtained the chief complaint(s) and history of present illness.  I confirmed and edited as necessary the review of systems, past medical/surgical history, family history, social history, and examination findings as documented by others; and I examined the patient myself.  I personally reviewed the relevant tests, images, and  reports as documented above.  I formulated and edited as necessary the assessment and plan and discussed the findings and management plan with the patient and family. - Michael Boyd MD

## 2021-12-26 ENCOUNTER — HEALTH MAINTENANCE LETTER (OUTPATIENT)
Age: 61
End: 2021-12-26

## 2022-01-06 ENCOUNTER — OFFICE VISIT (OUTPATIENT)
Dept: INTERNAL MEDICINE | Facility: CLINIC | Age: 62
End: 2022-01-06
Payer: COMMERCIAL

## 2022-01-06 VITALS
SYSTOLIC BLOOD PRESSURE: 114 MMHG | HEIGHT: 71 IN | WEIGHT: 164.1 LBS | HEART RATE: 78 BPM | DIASTOLIC BLOOD PRESSURE: 75 MMHG | OXYGEN SATURATION: 100 % | BODY MASS INDEX: 22.97 KG/M2

## 2022-01-06 DIAGNOSIS — E78.00 PURE HYPERCHOLESTEROLEMIA: ICD-10-CM

## 2022-01-06 DIAGNOSIS — D86.85 CARDIAC SARCOIDOSIS: ICD-10-CM

## 2022-01-06 DIAGNOSIS — I47.10 SVT (SUPRAVENTRICULAR TACHYCARDIA) (H): ICD-10-CM

## 2022-01-06 DIAGNOSIS — Z00.00 ENCOUNTER FOR ROUTINE ADULT MEDICAL EXAMINATION: Primary | ICD-10-CM

## 2022-01-06 DIAGNOSIS — I25.83 CORONARY ATHEROSCLEROSIS DUE TO LIPID RICH PLAQUE: ICD-10-CM

## 2022-01-06 PROBLEM — N40.0 BENIGN PROSTATIC HYPERPLASIA: Status: RESOLVED | Noted: 2021-03-24 | Resolved: 2022-01-06

## 2022-01-06 LAB
ALBUMIN SERPL-MCNC: 4.5 G/DL (ref 3.5–5)
ALP SERPL-CCNC: 66 U/L (ref 45–120)
ALT SERPL W P-5'-P-CCNC: 28 U/L (ref 0–45)
ANION GAP SERPL CALCULATED.3IONS-SCNC: 9 MMOL/L (ref 5–18)
AST SERPL W P-5'-P-CCNC: 23 U/L (ref 0–40)
BILIRUB SERPL-MCNC: 1.3 MG/DL (ref 0–1)
BUN SERPL-MCNC: 16 MG/DL (ref 8–22)
CALCIUM SERPL-MCNC: 9.9 MG/DL (ref 8.5–10.5)
CHLORIDE BLD-SCNC: 103 MMOL/L (ref 98–107)
CHOLEST SERPL-MCNC: 151 MG/DL
CO2 SERPL-SCNC: 27 MMOL/L (ref 22–31)
CREAT SERPL-MCNC: 0.9 MG/DL (ref 0.7–1.3)
FASTING STATUS PATIENT QL REPORTED: YES
GFR SERPL CREATININE-BSD FRML MDRD: >90 ML/MIN/1.73M2
GLUCOSE BLD-MCNC: 98 MG/DL (ref 70–125)
HDLC SERPL-MCNC: 56 MG/DL
LDLC SERPL CALC-MCNC: 82 MG/DL
POTASSIUM BLD-SCNC: 4.2 MMOL/L (ref 3.5–5)
PROT SERPL-MCNC: 7.1 G/DL (ref 6–8)
PSA SERPL-MCNC: 0.73 UG/L (ref 0–4.5)
SODIUM SERPL-SCNC: 139 MMOL/L (ref 136–145)
TRIGL SERPL-MCNC: 64 MG/DL

## 2022-01-06 PROCEDURE — 80061 LIPID PANEL: CPT | Performed by: INTERNAL MEDICINE

## 2022-01-06 PROCEDURE — 80053 COMPREHEN METABOLIC PANEL: CPT | Performed by: INTERNAL MEDICINE

## 2022-01-06 PROCEDURE — 99214 OFFICE O/P EST MOD 30 MIN: CPT | Mod: 25 | Performed by: INTERNAL MEDICINE

## 2022-01-06 PROCEDURE — 99396 PREV VISIT EST AGE 40-64: CPT | Performed by: INTERNAL MEDICINE

## 2022-01-06 PROCEDURE — 36415 COLL VENOUS BLD VENIPUNCTURE: CPT | Performed by: INTERNAL MEDICINE

## 2022-01-06 PROCEDURE — G0103 PSA SCREENING: HCPCS | Performed by: INTERNAL MEDICINE

## 2022-01-06 RX ORDER — RANOLAZINE 1000 MG/1
1000 TABLET, EXTENDED RELEASE ORAL
COMMUNITY
Start: 2021-12-28

## 2022-01-06 RX ORDER — SOTALOL HYDROCHLORIDE 80 MG/1
TABLET ORAL
COMMUNITY
Start: 2021-06-18 | End: 2023-02-03

## 2022-01-06 RX ORDER — MELATONIN 10 MG
1 CAPSULE ORAL
COMMUNITY

## 2022-01-06 RX ORDER — ATORVASTATIN CALCIUM 20 MG/1
20 TABLET, FILM COATED ORAL
Status: CANCELLED | OUTPATIENT
Start: 2022-01-06

## 2022-01-06 RX ORDER — ATORVASTATIN CALCIUM 20 MG/1
20 TABLET, FILM COATED ORAL DAILY
Qty: 90 TABLET | Refills: 3 | Status: SHIPPED | OUTPATIENT
Start: 2022-01-06 | End: 2022-01-27

## 2022-01-06 RX ORDER — METOPROLOL SUCCINATE 25 MG/1
25 TABLET, EXTENDED RELEASE ORAL DAILY
Qty: 90 TABLET | Refills: 4
Start: 2022-01-06

## 2022-01-06 RX ORDER — MYCOPHENOLATE MOFETIL 500 MG/1
1000 TABLET ORAL
COMMUNITY
Start: 2020-06-04

## 2022-01-06 ASSESSMENT — PATIENT HEALTH QUESTIONNAIRE - PHQ9
SUM OF ALL RESPONSES TO PHQ QUESTIONS 1-9: 11
10. IF YOU CHECKED OFF ANY PROBLEMS, HOW DIFFICULT HAVE THESE PROBLEMS MADE IT FOR YOU TO DO YOUR WORK, TAKE CARE OF THINGS AT HOME, OR GET ALONG WITH OTHER PEOPLE: SOMEWHAT DIFFICULT
SUM OF ALL RESPONSES TO PHQ QUESTIONS 1-9: 11

## 2022-01-06 ASSESSMENT — MIFFLIN-ST. JEOR: SCORE: 1563.54

## 2022-01-06 NOTE — PROGRESS NOTES
SUBJECTIVE:   CC: Fidel Woo is an 61 year old male who presents for preventative health visit.     ILLNESSES, HOSPITALIZATIONS, AND OPERATIONS:    #1.  History of cardiac sarcoidosis.  He is currently seeing a cardiologist at [facility] for this.  He is on MMF for this.  Had optic neuritis with amiodarone.    2.  Coronary artery disease, status post MI in 2010 along with subsequent stenting.  Recent CT angiogram of the heart in February 2021 showed a patent stent with no other stenoses.    3.  Hyperlipidemia, currently on 20 mg of a atorvastatin.    4.  History of ventricular tachycardia, status post ICD placement in March 2020.    Tony states that he has been having some mild fatigue recently but is not significantly interfering with his day-to-day activities.  He is continues to exercise on a regular basis.  He retired in the spring of last year and I congratulated him on this.    Patient has been advised of split billing requirements and indicates understanding: Yes  Healthy Habits:     Getting at least 3 servings of Calcium per day:  NO    Bi-annual eye exam:  Yes    Dental care twice a year:  Yes    Sleep apnea or symptoms of sleep apnea:  Daytime drowsiness    Diet:  Other    Frequency of exercise:  2-3 days/week    Duration of exercise:  30-45 minutes    Taking medications regularly:  Yes    Medication side effects:  Other    PHQ-2 Total Score: 3    Additional concerns today:  Yes    Today's PHQ-2 Score:   PHQ-2 ( 1999 Pfizer) 1/6/2022   Q1: Little interest or pleasure in doing things 2   Q2: Feeling down, depressed or hopeless 1   PHQ-2 Score 3   PHQ-2 Total Score (12-17 Years)- Positive if 3 or more points; Administer PHQ-A if positive -   Q1: Little interest or pleasure in doing things More than half the days   Q2: Feeling down, depressed or hopeless Several days   PHQ-2 Score 3       Abuse: Current or Past(Physical, Sexual or Emotional)- No  Do you feel safe in your environment?  "Yes    Have you ever done Advance Care Planning? (For example, a Health Directive, POLST, or a discussion with a medical provider or your loved ones about your wishes): Yes, advance care planning is on file.    Social History     Tobacco Use     Smoking status: Former Smoker     Smokeless tobacco: Never Used   Substance Use Topics     Alcohol use: Not on file     If you drink alcohol do you typically have >3 drinks per day or >7 drinks per week? No    Alcohol Use 1/6/2022   Prescreen: >3 drinks/day or >7 drinks/week? No       Last PSA:   Prostate Specific Antigen Screen   Date Value Ref Range Status   10/22/2020 1.2 0.0 - 4.5 ng/mL Final       OBJECTIVE:   /75   Pulse 78   Ht 1.791 m (5' 10.5\")   Wt 74.4 kg (164 lb 1.6 oz)   SpO2 100%   BMI 23.21 kg/m      Physical Exam  GENERAL: healthy, alert and no distress  EYES: Eyes grossly normal to inspection, PERRL and conjunctivae and sclerae normal  HENT: ear canals and TM's normal, nose and mouth without ulcers or lesions  NECK: no adenopathy, no asymmetry, masses, or scars and thyroid normal to palpation  RESP: lungs clear to auscultation - no rales, rhonchi or wheezes  CV: regular rate and rhythm, normal S1 S2, no S3 or S4, no murmur, click or rub, no peripheral edema and peripheral pulses strong  ABDOMEN: soft, nontender, no hepatosplenomegaly, no masses and bowel sounds normal  MS: no gross musculoskeletal defects noted, no edema  SKIN: no suspicious lesions or rashes  NEURO: Normal strength and tone, mentation intact and speech normal  PSYCH: mentation appears normal, affect normal/bright    Diagnostic Test Results:  Labs reviewed in Epic    ASSESSMENT/PLAN:   (Z00.00) Encounter for routine adult medical examination  (primary encounter diagnosis)  Comment: Vaccinations are up-to-date.  Colon cancer screening is up-to-date.  Check labs as noted below.  Follow-up 1 year, earlier if needed.  Plan: Comprehensive metabolic panel (BMP + Alb, Alk         Phos, " "ALT, AST, Total. Bili, TP), Lipid Profile        (Chol, Trig, HDL, LDL calc), PSA, screen            (D86.85) Cardiac sarcoidosis  Comment: Continue cardiology follow-up as scheduled.  Plan:     (E78.00) Pure hypercholesterolemia  Comment: Stable.  Check lipids today.  Plan: metoprolol succinate ER (TOPROL-XL) 25 MG 24 hr        tablet, atorvastatin (LIPITOR) 20 MG tablet            (I25.10,  I25.83) Coronary atherosclerosis due to lipid rich plaque  Comment: Stable.    (I47.1) SVT (supraventricular tachycardia) (H)  Comment: Stable.  Plan:     Patient has been advised of split billing requirements and indicates understanding: Yes      Estimated body mass index is 23.21 kg/m  as calculated from the following:    Height as of this encounter: 1.791 m (5' 10.5\").    Weight as of this encounter: 74.4 kg (164 lb 1.6 oz).         He reports that he has quit smoking. He has never used smokeless tobacco.      Counseling Resources:  ATP IV Guidelines  Pooled Cohorts Equation Calculator  FRAX Risk Assessment  ICSI Preventive Guidelines  Dietary Guidelines for Americans, 2010  [email or web address]'s MyPlate  ASA Prophylaxis  Lung CA Screening    Robles Padilla MD  [facility]  Answers for HPI/ROS submitted by the patient on 1/6/2022  If you checked off any problems, how difficult have these problems made it for you to do your work, take care of things at home, or get along with other people?: Somewhat difficult  PHQ9 TOTAL SCORE: 11      "

## 2022-01-07 ASSESSMENT — PATIENT HEALTH QUESTIONNAIRE - PHQ9: SUM OF ALL RESPONSES TO PHQ QUESTIONS 1-9: 11

## 2022-01-11 ENCOUNTER — TELEPHONE (OUTPATIENT)
Dept: INTERNAL MEDICINE | Facility: CLINIC | Age: 62
End: 2022-01-11
Payer: COMMERCIAL

## 2022-01-25 DIAGNOSIS — E78.00 PURE HYPERCHOLESTEROLEMIA: ICD-10-CM

## 2022-01-27 RX ORDER — ATORVASTATIN CALCIUM 20 MG/1
TABLET, FILM COATED ORAL
Qty: 90 TABLET | Refills: 2 | Status: SHIPPED | OUTPATIENT
Start: 2022-01-27 | End: 2022-12-05

## 2022-04-06 ENCOUNTER — OFFICE VISIT (OUTPATIENT)
Dept: OPHTHALMOLOGY | Facility: CLINIC | Age: 62
End: 2022-04-06
Payer: COMMERCIAL

## 2022-04-06 DIAGNOSIS — H47.20 OPTIC ATROPHY: Primary | ICD-10-CM

## 2022-04-06 DIAGNOSIS — H04.123 DRY EYE SYNDROME OF BOTH EYES: ICD-10-CM

## 2022-04-06 DIAGNOSIS — H53.40 VISUAL FIELD DEFECT: ICD-10-CM

## 2022-04-06 DIAGNOSIS — H53.40 VISUAL FIELD DEFECT: Primary | ICD-10-CM

## 2022-04-06 PROCEDURE — 92083 EXTENDED VISUAL FIELD XM: CPT | Mod: GC | Performed by: INTERNAL MEDICINE

## 2022-04-06 PROCEDURE — 92133 CPTRZD OPH DX IMG PST SGM ON: CPT | Mod: GC | Performed by: INTERNAL MEDICINE

## 2022-04-06 PROCEDURE — 92014 COMPRE OPH EXAM EST PT 1/>: CPT | Mod: GC | Performed by: INTERNAL MEDICINE

## 2022-04-06 ASSESSMENT — CONF VISUAL FIELD
METHOD: COUNTING FINGERS
OS_NORMAL: 1
OD_NORMAL: 1

## 2022-04-06 ASSESSMENT — TONOMETRY
OD_IOP_MMHG: 09
OS_IOP_MMHG: 09
IOP_METHOD: ICARE

## 2022-04-06 ASSESSMENT — VISUAL ACUITY
OD_SC+: -1
OD_SC: 20/100
METHOD: SNELLEN - LINEAR
OS_SC: 20/20

## 2022-04-06 ASSESSMENT — CUP TO DISC RATIO
OD_RATIO: 0.3
OS_RATIO: 0.3

## 2022-04-06 ASSESSMENT — SLIT LAMP EXAM - LIDS
COMMENTS: MILD MGD
COMMENTS: MILD MGD

## 2022-04-06 ASSESSMENT — EXTERNAL EXAM - LEFT EYE: OS_EXAM: NORMAL

## 2022-04-06 ASSESSMENT — EXTERNAL EXAM - RIGHT EYE: OD_EXAM: NORMAL

## 2022-04-06 NOTE — PROGRESS NOTES
Assessment & Plan     Fidel Woo is a 61 year old male with the following diagnoses:   1. Optic atrophy    2. Visual field defect    3. Dry eye syndrome of both eyes       Patient was last seen on 9/8/21 for optic neuropathy right greater than left eye likely due to amiodarone.  He stopped amiodarone after 3/24/21.  He also has biopsy proven cardiac sarcoidosis.  Last visit he had improved optic disc edema both eyes.   He thinks he may be a little improved in the RIGHT eye and no change in the left eye.      Reports that he had one episode of 30 seconds of yellowing of the vision. He didn't check if it was the right eye or left eye. He also had one episode at night with purple vision. Otherwise subjectively he hasn't noted any changes.     He does report severe watering of his eyes when playing tennis and walking. He is not using drops.    Exam:  Visual acuity without correction was 20/100 in the right eye and 20/20 in the left eye. IOP was 9 in the right eye and 9 in the left eye. Visual fields were full in both eyes. Ocular motility was full in all gaze directions. Color plates were 0/11 in the right eye and 11/11 in the left eye.  Pupils were equal, round and reactive to light with no RAPD.     Strabismus exam: full ortho  Anterior segment exam: no definite verticillata  Dilated fundus exam: moderate pallor    Tests ordered and interpreted today:  OCT RNFL 53 (55) right eye 108(112)  left.   Visual field: unchanged from prior with fair reliability     It is my impression that patient has optic neuropathy right eye greater than left eye due to amiodarone.  Overall, it does not appear that he has any damage to the left eye at all.  While he had optic disc edema in the left eye at presentation, this has resolved.  His nerve fiber layer is normal in the left eye.  His visual acuity is stable in both eyes.  Optical coherence tomography and visual field are stable  Follow up in 12 months or sooner as  needed for worsening symptoms.  Monocular precautions discussed.     In regard to watering eyes, we discussed that this is because of dry eye. He has mild MGD. Recommended artificial tears as needed and warm compresses daily.        Attending Physician Attestation:  Complete documentation of historical and exam elements from today's encounter can be found in the full encounter summary report (not reduplicated in this progress note).  I personally obtained the chief complaint(s) and history of present illness.  I confirmed and edited as necessary the review of systems, past medical/surgical history, family history, social history, and examination findings as documented by others; and I examined the patient myself.  I personally reviewed the relevant tests, images, and reports as documented above.  I formulated and edited as necessary the assessment and plan and discussed the findings and management plan with the patient and family. I personally reviewed the ophthalmic test(s) associated with this encounter, agree with the interpretation(s) as documented by the resident/fellow, and have edited the corresponding report(s) as necessary.  - Michael Guerrier, DO   PGY-5 Neuro-Ophthalmology Fellow

## 2022-07-30 ENCOUNTER — NURSE TRIAGE (OUTPATIENT)
Dept: NURSING | Facility: CLINIC | Age: 62
End: 2022-07-30

## 2022-07-31 NOTE — TELEPHONE ENCOUNTER
Pt calling in to nurse triage line today after testing positive for COVID-19 on an at home rapid test. Pt. Positive test was today. Pt is having chest congestion, coughing and UR s/sx. Mild headache that has passed and mild fatigue. Pt has a PMH of Cardiac Sarcoidosis, SVT and asthma per report. started feeling a little under the weather Thursday night into Friday morning. Has had 4 doses of vaccine. SpO2 95 and HR 70. No difficulty breathing. Pt reports he recently was travelling in [place].  Pt is interested in antiviral therapy options. Pt given alternate disposition to Telemedicine. Warm transfer to scheduling for a COVID VV. Pt is amenable to plan and verbalizes agreement and understanding.    Neelima Baez, RN, [place], PHN on 7/30/2022 at 7:46 PM  [facility]  RN utilized sound nursing judgement based on facility triage protocols during this encounter.             Reason for Disposition    HIGH RISK for severe COVID complications (e.g., weak immune system, age > 64 years, obesity with BMI > 25, pregnant, chronic lung disease or other chronic medical condition)  (Exception: Already seen by PCP and no new or worsening symptoms.)    Additional Information    Negative: SEVERE difficulty breathing (e.g., struggling for each breath, speaks in single words)    Negative: Difficult to awaken or acting confused (e.g., disoriented, slurred speech)    Negative: Shock suspected (e.g., cold/pale/clammy skin, too weak to stand, low BP, rapid pulse)    Negative: Bluish (or gray) lips or face now    Negative: Sounds like a life-threatening emergency to the triager    Negative: [1] Diagnosed or suspected COVID-19 AND [2] symptoms lasting 3 or more weeks    Negative: [1] COVID-19 exposure AND [2] no symptoms    Negative: COVID-19 vaccine reaction suspected (e.g., fever, headache, muscle aches) occurring 1 to 3 days after getting vaccine    Negative: COVID-19 vaccine, questions about    Negative: [1] Lives with  "someone known to have influenza (flu test positive) AND [2] flu-like symptoms (e.g., cough, runny nose, sore throat, SOB; with or without fever)    Negative: [1] Adult with possible COVID-19 symptoms AND [2] triager concerned about severity of symptoms or other causes    Negative: COVID-19 and breastfeeding, questions about    Negative: SEVERE or constant chest pain or pressure  (Exception: Mild central chest pain, present only when coughing.)    Negative: MODERATE difficulty breathing (e.g., speaks in phrases, SOB even at rest, pulse 100-120)    Negative: [1] Headache AND [2] stiff neck (can't touch chin to chest)    Negative: Oxygen level (e.g., pulse oximetry) 90 percent or lower    Negative: Chest pain or pressure    Negative: Patient sounds very sick or weak to the triager    Negative: MILD difficulty breathing (e.g., minimal/no SOB at rest, SOB with walking, pulse <100)    Negative: Fever > 103 F (39.4 C)    Negative: [1] Fever > 101 F (38.3 C) AND [2] age > 60 years    Negative: [1] Fever > 100.0 F (37.8 C) AND [2] bedridden (e.g., nursing home patient, CVA, chronic illness, recovering from surgery)    Answer Assessment - Initial Assessment Questions  1. COVID-19 DIAGNOSIS: \"Who made your COVID-19 diagnosis?\" \"Was it confirmed by a positive lab test or self-test?\" If not diagnosed by a doctor (or NP/PA), ask \"Are there lots of cases (community spread) where you live?\" Note: See public health department website, if unsure.      Rapid at home was positive today - [facility]     2. COVID-19 EXPOSURE: \"Was there any known exposure to COVID before the symptoms began?\" CDC Definition of close contact: within 6 feet (2 meters) for a total of 15 minutes or more over a 24-hour period.       Unknown exposure     3. ONSET: \"When did the COVID-19 symptoms start?\"       Thursday night around 10 PM light s/sx     4. WORST SYMPTOM: \"What is your worst symptom?\" (e.g., cough, fever, shortness of breath, muscle aches)      " "Airway congestion - PMH of asthma as a child so struggles with colds and URI  5. COUGH: \"Do you have a cough?\" If Yes, ask: \"How bad is the cough?\"        Light cough now managed with cold medications     6. FEVER: \"Do you have a fever?\" If Yes, ask: \"What is your temperature, how was it measured, and when did it start?\"      Did have chills no known or measured fever     7. RESPIRATORY STATUS: \"Describe your breathing?\" (e.g., shortness of breath, wheezing, unable to speak)       Not at this time     8. BETTER-SAME-WORSE: \"Are you getting better, staying the same or getting worse compared to yesterday?\"  If getting worse, ask, \"In what way?\"      Little worse   9. HIGH RISK DISEASE: \"Do you have any chronic medical problems?\" (e.g., asthma, heart or lung disease, weak immune system, obesity, etc.)      PMH asthma, sarcoidosis of the heart and  is on immunosuppression  mycophenolate     10. VACCINE: \"Have you had the COVID-19 vaccine?\" If Yes, ask: \"Which one, how many shots, when did you get it?\"        2 shots of moderna   11. BOOSTER: \"Have you received your COVID-19 booster?\" If Yes, ask: \"Which one and when did you get it?\"        1 moderna and 1 pfizer     13. OTHER SYMPTOMS: \"Do you have any other symptoms?\"  (e.g., chills, fatigue, headache, loss of smell or taste, muscle pain, sore throat)        Fatigued    14. O2 SATURATION MONITOR:  \"Do you use an oxygen saturation monitor (pulse oximeter) at home?\" If Yes, ask \"What is your reading (oxygen level) today?\" \"What is your usual oxygen saturation reading?\" (e.g., 95%)        Sp02 95%  HR 70    Protocols used: CORONAVIRUS (COVID-19) DIAGNOSED OR [id number] 1.18.2022  COVID 19 Nurse Triage Plan/Patient Instructions    Please be aware that novel coronavirus (COVID-19) may be circulating in the community. If you develop symptoms such as fever, cough, or SOB or if you have concerns about the presence of another infection including coronavirus (COVID-19), " please contact your health care provider or visit [email or web address].     Disposition/Instructions    Virtual Visit with provider recommended. Reference Visit Selection Guide.    Thank you for taking steps to prevent the spread of this virus.  o Limit your contact with others.  o Wear a simple mask to cover your cough.  o Wash your hands well and often.    Resources    [facility]: About COVID-19: [email or web address]    CDC: What to Do If You're Sick: www.cdc.gov/coronavirus/2019-ncov/about/steps-when-sick.html    CDC: Ending Home Isolation: www.cdc.gov/coronavirus/2019-ncov/hcp/disposition-in-home-patients.html     CDC: Caring for Someone: www.cdc.gov/coronavirus/2019-ncov/if-you-are-sick/care-for-someone.html     [facility]: Interim Guidance for Hospital Discharge to Home: www.[facility].[email or web address]/diseases/coronavirus/hcp/hospdischarge.pdf    [facility] clinical trials (COVID-19 research studies): [email or web address]-clinical-trials     Below are the COVID-19 hotlines at the [place] Department of [facility] ([facility]). Interpreters are available.   o For health questions: Call 282-497-0596 or 1-280.328.3915 (7 a.m. to 7 p.m.)  o For questions about schools and childcare: Call 893-031-9407 or 1-984.571.1527 (7 a.m. to 7 p.m.)

## 2022-10-23 ENCOUNTER — HEALTH MAINTENANCE LETTER (OUTPATIENT)
Age: 62
End: 2022-10-23

## 2022-12-03 DIAGNOSIS — E78.00 PURE HYPERCHOLESTEROLEMIA: ICD-10-CM

## 2022-12-05 RX ORDER — ATORVASTATIN CALCIUM 20 MG/1
TABLET, FILM COATED ORAL
Qty: 90 TABLET | Refills: 0 | Status: SHIPPED | OUTPATIENT
Start: 2022-12-05 | End: 2023-03-10

## 2022-12-05 NOTE — TELEPHONE ENCOUNTER
"Last Written Prescription Date:  1/27/22  Last Fill Quantity: 90,  # refills: 2   Last office visit provider:  1/6/22     Requested Prescriptions   Pending Prescriptions Disp Refills     atorvastatin (LIPITOR) 20 MG tablet [Pharmacy Med Name: atorvastatin 20 mg tablet (LIPITOR)] 90 tablet 2     Sig: Take 1 Tablet (20 mg) by mouth once daily.       Statins Protocol Passed - 12/5/2022  2:03 PM        Passed - LDL on file in past 12 months     Recent Labs   Lab Test 01/06/22  1043   LDL 82             Passed - No abnormal creatine kinase in past 12 months     No lab results found.             Passed - Recent (12 mo) or future (30 days) visit within the authorizing provider's specialty     Patient has had an office visit with the authorizing provider or a provider within the authorizing providers department within the previous 12 mos or has a future within next 30 days. See \"Patient Info\" tab in inbasket, or \"Choose Columns\" in Meds & Orders section of the refill encounter.              Passed - Medication is active on med list        Passed - Patient is age 18 or older             Moise Almanza RN 12/05/22 2:03 PM  "

## 2023-02-03 ENCOUNTER — OFFICE VISIT (OUTPATIENT)
Dept: INTERNAL MEDICINE | Facility: CLINIC | Age: 63
End: 2023-02-03
Payer: COMMERCIAL

## 2023-02-03 VITALS
OXYGEN SATURATION: 100 % | HEIGHT: 70 IN | WEIGHT: 173 LBS | DIASTOLIC BLOOD PRESSURE: 66 MMHG | RESPIRATION RATE: 14 BRPM | TEMPERATURE: 98.2 F | BODY MASS INDEX: 24.77 KG/M2 | HEART RATE: 75 BPM | SYSTOLIC BLOOD PRESSURE: 102 MMHG

## 2023-02-03 DIAGNOSIS — Z00.00 ENCOUNTER FOR ROUTINE ADULT MEDICAL EXAMINATION: Primary | ICD-10-CM

## 2023-02-03 DIAGNOSIS — I25.83 CORONARY ATHEROSCLEROSIS DUE TO LIPID RICH PLAQUE: ICD-10-CM

## 2023-02-03 DIAGNOSIS — E78.00 PURE HYPERCHOLESTEROLEMIA: ICD-10-CM

## 2023-02-03 DIAGNOSIS — N52.9 ERECTILE DYSFUNCTION, UNSPECIFIED ERECTILE DYSFUNCTION TYPE: ICD-10-CM

## 2023-02-03 DIAGNOSIS — D86.85 CARDIAC SARCOIDOSIS: ICD-10-CM

## 2023-02-03 DIAGNOSIS — Z12.5 SCREENING FOR PROSTATE CANCER: ICD-10-CM

## 2023-02-03 PROBLEM — C44.90 MALIGNANT NEOPLASM OF SKIN: Status: ACTIVE | Noted: 2022-07-14

## 2023-02-03 LAB
CHOLEST SERPL-MCNC: 192 MG/DL
HDLC SERPL-MCNC: 54 MG/DL
LDLC SERPL CALC-MCNC: 114 MG/DL
NONHDLC SERPL-MCNC: 138 MG/DL
PSA SERPL-MCNC: 1.16 NG/ML (ref 0–4.5)
TRIGL SERPL-MCNC: 122 MG/DL

## 2023-02-03 PROCEDURE — 99214 OFFICE O/P EST MOD 30 MIN: CPT | Mod: 25 | Performed by: INTERNAL MEDICINE

## 2023-02-03 PROCEDURE — 80061 LIPID PANEL: CPT | Performed by: INTERNAL MEDICINE

## 2023-02-03 PROCEDURE — 36415 COLL VENOUS BLD VENIPUNCTURE: CPT | Performed by: INTERNAL MEDICINE

## 2023-02-03 PROCEDURE — G0103 PSA SCREENING: HCPCS | Performed by: INTERNAL MEDICINE

## 2023-02-03 PROCEDURE — 99396 PREV VISIT EST AGE 40-64: CPT | Performed by: INTERNAL MEDICINE

## 2023-02-03 RX ORDER — SILDENAFIL CITRATE 20 MG/1
20-100 TABLET ORAL DAILY PRN
Qty: 30 TABLET | Refills: 0 | Status: SHIPPED | OUTPATIENT
Start: 2023-02-03 | End: 2023-06-16

## 2023-02-03 ASSESSMENT — ENCOUNTER SYMPTOMS
FREQUENCY: 0
PALPITATIONS: 1
FEVER: 0
HEARTBURN: 0
HEMATOCHEZIA: 0
ABDOMINAL PAIN: 0
SORE THROAT: 0
DIZZINESS: 0
DYSURIA: 0
HEADACHES: 1
SHORTNESS OF BREATH: 0
WEAKNESS: 0
JOINT SWELLING: 1
MYALGIAS: 0
COUGH: 0
CONSTIPATION: 0
EYE PAIN: 0
NERVOUS/ANXIOUS: 0
DIARRHEA: 0
ARTHRALGIAS: 1
PARESTHESIAS: 1
HEMATURIA: 0
CHILLS: 0
NAUSEA: 0

## 2023-02-03 NOTE — PROGRESS NOTES
SUBJECTIVE:   CC: Tony is an 62 year old who presents for preventative health visit.     Ariel comes in today for his yearly physical.  He is feeling well today and has no acute complaints.  He has had some persistent erectile dysfunction over the last few months and is wondering if he can take Viagra with the medications that he is on.  He is on no long-acting nitrates and does not take nitroglycerin.  Otherwise past medical history above and beyond his physical exam was reviewed and is outlined as below:    ILLNESSES, HOSPITALIZATIONS, AND OPERATIONS:     #1.  History of cardiac sarcoidosis.  He is currently seeing a Dr. Paris (cardiology) at [facility] and is on MMF.  Had optic neuritis with amiodarone.     2.  Coronary artery disease, status post MI in 2010 along with subsequent stenting.  CT angiogram of the heart in February 2021 showed a patent stent with no other stenoses.     3.  Hyperlipidemia, currently on 20 mg of a atorvastatin.     4.  History of ventricular tachycardia, status post ICD placement in March 2020.      Patient has been advised of split billing requirements and indicates understanding: Yes  Healthy Habits:     Getting at least 3 servings of Calcium per day:  Yes    Bi-annual eye exam:  Yes    Dental care twice a year:  Yes    Sleep apnea or symptoms of sleep apnea:  Daytime drowsiness    Diet:  Regular (no restrictions)    Frequency of exercise:  2-3 days/week    Duration of exercise:  15-30 minutes    Taking medications regularly:  Yes    Medication side effects:  Other    PHQ-2 Total Score: 2    Additional concerns today:  Yes    Today's PHQ-2 Score:   PHQ-2 ( 1999 [employer]) 2/3/2023   Q1: Little interest or pleasure in doing things 1   Q2: Feeling down, depressed or hopeless 1   PHQ-2 Score 2   PHQ-2 Total Score (12-17 Years)- Positive if 3 or more points; Administer PHQ-A if positive -   Q1: Little interest or pleasure in doing things Several days   Q2: Feeling down, depressed  "or hopeless Several days   PHQ-2 Score 2           Social History     Tobacco Use     Smoking status: Former     Smokeless tobacco: Never   Substance Use Topics     Alcohol use: Not on file     If you drink alcohol do you typically have >3 drinks per day or >7 drinks per week? No    Alcohol Use 2/3/2023   Prescreen: >3 drinks/day or >7 drinks/week? No   Prescreen: >3 drinks/day or >7 drinks/week? -       Last PSA:   Prostate Specific Antigen Screen   Date Value Ref Range Status   01/06/2022 0.73 0.00 - 4.50 ug/L Final       Review of Systems   Constitutional: Negative for chills and fever.   HENT: Positive for hearing loss. Negative for congestion, ear pain and sore throat.    Eyes: Negative for pain and visual disturbance.   Respiratory: Negative for cough and shortness of breath.    Cardiovascular: Positive for palpitations. Negative for chest pain and peripheral edema.   Gastrointestinal: Negative for abdominal pain, constipation, diarrhea, heartburn, hematochezia and nausea.   Genitourinary: Positive for impotence and urgency. Negative for dysuria, frequency, genital sores, hematuria and penile discharge.   Musculoskeletal: Positive for arthralgias and joint swelling. Negative for myalgias.   Skin: Negative for rash.   Neurological: Positive for headaches and paresthesias. Negative for dizziness and weakness.   Psychiatric/Behavioral: Negative for mood changes. The patient is not nervous/anxious.          OBJECTIVE:   /66 (BP Location: Right arm, Patient Position: Sitting, Cuff Size: Adult Regular)   Pulse 75   Temp 98.2  F (36.8  C)   Resp 14   Ht 1.778 m (5' 10\")   Wt 78.5 kg (173 lb)   SpO2 100%   BMI 24.82 kg/m      Physical Exam  GENERAL: healthy, alert and no distress  EYES: Eyes grossly normal to inspection, PERRL and conjunctivae and sclerae normal  HENT: ear canals and TM's normal, nose and mouth without ulcers or lesions  NECK: no adenopathy, no asymmetry, masses, or scars and thyroid " normal to palpation  RESP: lungs clear to auscultation - no rales, rhonchi or wheezes  CV: regular rate and rhythm, normal S1 S2, no S3 or S4, no murmur, click or rub, no peripheral edema and peripheral pulses strong  ABDOMEN: soft, nontender, no hepatosplenomegaly, no masses and bowel sounds normal  MS: no gross musculoskeletal defects noted, no edema  SKIN: no suspicious lesions or rashes  NEURO: Normal strength and tone, mentation intact and speech normal  PSYCH: mentation appears normal, affect normal/bright    Diagnostic Test Results:  Labs reviewed in Epic    ASSESSMENT/PLAN:   (Z00.00) Encounter for routine adult medical examination  (primary encounter diagnosis)  Comment: Vaccinations are up-to-date.  Colon cancer screening will be due next year.  Check labs as below.  Follow-up in 1 year for routine health maintenance.  Plan:     (D86.85) Cardiac sarcoidosis  Comment: Stable.  Continue follow-up with cardiology as scheduled.  Plan:     (I25.10,  I25.83) Coronary atherosclerosis due to lipid rich plaque  Comment: Stable.  Risk factors under control.  Check lipids as below.  Plan:     (E78.00) Pure hypercholesterolemia  Comment:   Plan: Lipid Profile (Chol, Trig, HDL, LDL calc)            (N52.9) Erectile dysfunction, unspecified erectile dysfunction type  Comment: Prescription for Viagra was sent in for him.  Plan: sildenafil (REVATIO) 20 MG tablet            (Z12.5) Screening for prostate cancer  Comment:   Plan: PSA, screen                He reports that he has quit smoking. He has never used smokeless tobacco.            Robles Padilla MD  [facility]

## 2023-03-09 DIAGNOSIS — E78.00 PURE HYPERCHOLESTEROLEMIA: ICD-10-CM

## 2023-03-10 RX ORDER — ATORVASTATIN CALCIUM 20 MG/1
TABLET, FILM COATED ORAL
Qty: 90 TABLET | Refills: 0 | Status: SHIPPED | OUTPATIENT
Start: 2023-03-10 | End: 2023-06-16

## 2023-03-10 NOTE — TELEPHONE ENCOUNTER
"Last Written Prescription Date:  12/5/2022  Last Fill Quantity: 90,  # refills: 0   Last office visit provider:  2/3/2023     Requested Prescriptions   Pending Prescriptions Disp Refills     atorvastatin (LIPITOR) 20 MG tablet [Pharmacy Med Name: atorvastatin 20 mg tablet (LIPITOR)] 90 tablet 0     Sig: Take 1 Tablet (20 mg) by mouth once daily.       Statins Protocol Passed - 3/9/2023 11:25 AM        Passed - LDL on file in past 12 months     Recent Labs   Lab Test 02/03/23  1356   *             Passed - No abnormal creatine kinase in past 12 months     No lab results found.             Passed - Recent (12 mo) or future (30 days) visit within the authorizing provider's specialty     Patient has had an office visit with the authorizing provider or a provider within the authorizing providers department within the previous 12 mos or has a future within next 30 days. See \"Patient Info\" tab in inbasket, or \"Choose Columns\" in Meds & Orders section of the refill encounter.              Passed - Medication is active on med list        Passed - Patient is age 18 or older             Chelsey Ward RN 03/10/23 3:00 PM  "

## 2023-06-16 DIAGNOSIS — E78.00 PURE HYPERCHOLESTEROLEMIA: ICD-10-CM

## 2023-06-16 DIAGNOSIS — N52.9 ERECTILE DYSFUNCTION, UNSPECIFIED ERECTILE DYSFUNCTION TYPE: ICD-10-CM

## 2023-06-16 RX ORDER — ATORVASTATIN CALCIUM 20 MG/1
TABLET, FILM COATED ORAL
Qty: 90 TABLET | Refills: 2 | Status: SHIPPED | OUTPATIENT
Start: 2023-06-16

## 2023-06-16 RX ORDER — SILDENAFIL CITRATE 20 MG/1
20-100 TABLET ORAL DAILY PRN
Qty: 30 TABLET | Refills: 0 | Status: SHIPPED | OUTPATIENT
Start: 2023-06-16

## 2023-06-17 NOTE — TELEPHONE ENCOUNTER
"Last Written Prescription Date:  2/3/23  Last Fill Quantity: 30,  # refills: 0   Last office visit provider:   2/3/23    Requested Prescriptions   Pending Prescriptions Disp Refills     sildenafil (REVATIO) 20 MG tablet [Pharmacy Med Name: sildenafiL (pulmonary hypertension) 20 mg tablet (REVATIO)] 30 tablet 0     Sig: Take 1-5 tablets ( mg) by mouth daily as needed       Erectile Dysfuction Protocol Passed - 6/16/2023  9:46 AM        Passed - Absence of nitrates on medication list        Passed - Absence of Alpha Blockers on Med list        Passed - Recent (12 mo) or future (30 days) visit within the authorizing provider's specialty     Patient has had an office visit with the authorizing provider or a provider within the authorizing providers department within the previous 12 mos or has a future within next 30 days. See \"Patient Info\" tab in [employer], or \"Choose Columns\" in Meds & Orders section of the refill encounter.              Passed - Medication is active on med list        Passed - Patient is age 18 or older        Last Written Prescription Date:  3/10/23  Last Fill Quantity: 90,  # refills: 0   Last office visit provider:  2/3/23       atorvastatin (LIPITOR) 20 MG tablet [Pharmacy Med Name: atorvastatin 20 mg tablet (LIPITOR)] 90 tablet 0     Sig: Take 1 Tablet (20 mg) by mouth once daily.       Statins Protocol Passed - 6/16/2023  9:46 AM        Passed - LDL on file in past 12 months     Recent Labs   Lab Test 02/03/23  1356   *             Passed - No abnormal creatine kinase in past 12 months     No lab results found.             Passed - Recent (12 mo) or future (30 days) visit within the authorizing provider's specialty     Patient has had an office visit with the authorizing provider or a provider within the authorizing providers department within the previous 12 mos or has a future within next 30 days. See \"Patient Info\" tab in inbasket, or \"Choose Columns\" in Meds & Orders section of " the refill encounter.              Passed - Medication is active on med list        Passed - Patient is age 18 or older             Celi Strong RN 06/16/23 9:39 PM

## 2023-06-19 ENCOUNTER — TELEPHONE (OUTPATIENT)
Dept: INTERNAL MEDICINE | Facility: CLINIC | Age: 63
End: 2023-06-19
Payer: COMMERCIAL

## 2023-06-19 NOTE — TELEPHONE ENCOUNTER
"Prior Authorization Request   Who s requesting:  Pharmacy  Pharmacy Name and Location:  [facility]  Medication Name: sildenafil 20 mg tab  Insurance Plan: [id number]  Key: None,\"   "

## 2023-06-22 NOTE — TELEPHONE ENCOUNTER
Central Prior Authorization Team   Phone: [phone]      PA Initiation    Medication: SILDENAFIL CITRATE 20 MG PO TABS  Insurance Company: [employer] - Phone [phone] Fax [phone]  [facility] [place] [place] - [address]  Filling Pharmacy Phone: [phone]  Filling Pharmacy Fax:    Start Date: 6/22/2023    Started PA on CMM and a response of This medication may be excluded from the patient's benefit. For more information, please reach out to [employer] directly.  Per insurance, this medication is a plan exclusion and there is not an option to complete a PA.

## 2024-03-30 ENCOUNTER — HEALTH MAINTENANCE LETTER (OUTPATIENT)
Age: 64
End: 2024-03-30

## 2025-03-23 ENCOUNTER — HEALTH MAINTENANCE LETTER (OUTPATIENT)
Age: 65
End: 2025-03-23